# Patient Record
Sex: FEMALE | Race: WHITE | NOT HISPANIC OR LATINO | Employment: UNEMPLOYED | URBAN - METROPOLITAN AREA
[De-identification: names, ages, dates, MRNs, and addresses within clinical notes are randomized per-mention and may not be internally consistent; named-entity substitution may affect disease eponyms.]

---

## 2017-04-18 ENCOUNTER — HOSPITAL ENCOUNTER (EMERGENCY)
Facility: HOSPITAL | Age: 32
Discharge: HOME | End: 2017-04-18
Admitting: EMERGENCY MEDICINE
Payer: COMMERCIAL

## 2017-04-18 VITALS
WEIGHT: 175 LBS | SYSTOLIC BLOOD PRESSURE: 143 MMHG | HEIGHT: 66 IN | TEMPERATURE: 98.1 F | DIASTOLIC BLOOD PRESSURE: 64 MMHG | RESPIRATION RATE: 18 BRPM | BODY MASS INDEX: 28.12 KG/M2 | HEART RATE: 99 BPM | OXYGEN SATURATION: 99 %

## 2017-04-18 DIAGNOSIS — N89.8 VAGINAL DISCHARGE: Primary | ICD-10-CM

## 2017-04-18 PROCEDURE — 99283 EMERGENCY DEPT VISIT LOW MDM: CPT

## 2017-04-18 PROCEDURE — 87070 CULTURE OTHR SPECIMN AEROBIC: CPT | Performed by: PHYSICIAN ASSISTANT

## 2017-04-18 PROCEDURE — 87591 N.GONORRHOEAE DNA AMP PROB: CPT | Performed by: PHYSICIAN ASSISTANT

## 2017-04-18 PROCEDURE — 87491 CHLMYD TRACH DNA AMP PROBE: CPT | Performed by: PHYSICIAN ASSISTANT

## 2017-04-18 RX ORDER — CLINDAMYCIN HYDROCHLORIDE 300 MG/1
300 CAPSULE ORAL 2 TIMES DAILY
Qty: 14 CAPSULE | Refills: 0 | Status: SHIPPED | OUTPATIENT
Start: 2017-04-18 | End: 2017-04-25

## 2017-04-19 LAB
CHLAMYDIA DNA CVX QL NAA+PROBE: NORMAL
N GONORRHOEA DNA GENITAL QL NAA+PROBE: NORMAL

## 2017-04-21 LAB
BACTERIA GENITAL AEROBE CULT: NORMAL
BACTERIA GENITAL AEROBE CULT: NORMAL

## 2017-05-07 ENCOUNTER — APPOINTMENT (EMERGENCY)
Dept: RADIOLOGY | Facility: HOSPITAL | Age: 32
End: 2017-05-07
Payer: COMMERCIAL

## 2017-05-07 ENCOUNTER — HOSPITAL ENCOUNTER (EMERGENCY)
Facility: HOSPITAL | Age: 32
Discharge: HOME | End: 2017-05-07
Attending: EMERGENCY MEDICINE | Admitting: EMERGENCY MEDICINE
Payer: COMMERCIAL

## 2017-05-07 VITALS
WEIGHT: 165 LBS | DIASTOLIC BLOOD PRESSURE: 65 MMHG | SYSTOLIC BLOOD PRESSURE: 140 MMHG | HEART RATE: 100 BPM | OXYGEN SATURATION: 100 % | BODY MASS INDEX: 26.63 KG/M2 | RESPIRATION RATE: 18 BRPM

## 2017-05-07 DIAGNOSIS — S83.91XA RIGHT KNEE SPRAIN: Primary | ICD-10-CM

## 2017-05-07 PROCEDURE — 73562 X-RAY EXAM OF KNEE 3: CPT

## 2017-05-07 PROCEDURE — 99283 EMERGENCY DEPT VISIT LOW MDM: CPT

## 2017-05-07 RX ORDER — TRAMADOL HYDROCHLORIDE 50 MG/1
50 TABLET ORAL ONCE
Status: DISCONTINUED | OUTPATIENT
Start: 2017-05-07 | End: 2017-05-07 | Stop reason: HOSPADM

## 2017-05-07 RX ORDER — IBUPROFEN 800 MG/1
800 TABLET ORAL EVERY 8 HOURS PRN
Qty: 15 TABLET | Refills: 0 | Status: SHIPPED | OUTPATIENT
Start: 2017-05-07 | End: 2017-08-25

## 2017-05-07 RX ORDER — TRAMADOL HYDROCHLORIDE 50 MG/1
50 TABLET ORAL EVERY 8 HOURS PRN
Qty: 15 TABLET | Refills: 0 | Status: SHIPPED | OUTPATIENT
Start: 2017-05-07 | End: 2017-08-25

## 2017-05-07 RX ORDER — IBUPROFEN 400 MG/1
800 TABLET ORAL ONCE
Status: DISCONTINUED | OUTPATIENT
Start: 2017-05-07 | End: 2017-05-07 | Stop reason: HOSPADM

## 2017-05-09 ENCOUNTER — GENERIC CONVERSION - ENCOUNTER (OUTPATIENT)
Dept: OTHER | Facility: OTHER | Age: 32
End: 2017-05-09

## 2017-05-09 ENCOUNTER — ALLSCRIPTS OFFICE VISIT (OUTPATIENT)
Dept: OTHER | Facility: OTHER | Age: 32
End: 2017-05-09

## 2017-05-17 ENCOUNTER — GENERIC CONVERSION - ENCOUNTER (OUTPATIENT)
Dept: OTHER | Facility: OTHER | Age: 32
End: 2017-05-17

## 2017-07-05 ENCOUNTER — ALLSCRIPTS OFFICE VISIT (OUTPATIENT)
Dept: OTHER | Facility: OTHER | Age: 32
End: 2017-07-05

## 2017-07-05 DIAGNOSIS — M25.561 PAIN IN RIGHT KNEE: ICD-10-CM

## 2017-07-15 ENCOUNTER — HOSPITAL ENCOUNTER (OUTPATIENT)
Dept: RADIOLOGY | Facility: HOSPITAL | Age: 32
Discharge: HOME | End: 2017-07-15
Attending: ORTHOPAEDIC SURGERY
Payer: COMMERCIAL

## 2017-07-15 DIAGNOSIS — M25.561 PAIN IN RIGHT KNEE: ICD-10-CM

## 2017-07-15 PROCEDURE — 73721 MRI JNT OF LWR EXTRE W/O DYE: CPT

## 2017-07-19 ENCOUNTER — ALLSCRIPTS OFFICE VISIT (OUTPATIENT)
Dept: OTHER | Facility: OTHER | Age: 32
End: 2017-07-19

## 2017-07-28 ENCOUNTER — ALLSCRIPTS OFFICE VISIT (OUTPATIENT)
Dept: OTHER | Facility: OTHER | Age: 32
End: 2017-07-28

## 2017-08-25 ENCOUNTER — APPOINTMENT (EMERGENCY)
Dept: RADIOLOGY | Facility: HOSPITAL | Age: 32
End: 2017-08-25
Payer: COMMERCIAL

## 2017-08-25 ENCOUNTER — HOSPITAL ENCOUNTER (EMERGENCY)
Facility: HOSPITAL | Age: 32
Discharge: HOME | End: 2017-08-25
Admitting: EMERGENCY MEDICINE
Payer: COMMERCIAL

## 2017-08-25 VITALS
HEART RATE: 108 BPM | SYSTOLIC BLOOD PRESSURE: 122 MMHG | HEIGHT: 66 IN | OXYGEN SATURATION: 97 % | WEIGHT: 171 LBS | RESPIRATION RATE: 20 BRPM | TEMPERATURE: 97.3 F | DIASTOLIC BLOOD PRESSURE: 71 MMHG | BODY MASS INDEX: 27.48 KG/M2

## 2017-08-25 DIAGNOSIS — O03.9 SPONTANEOUS ABORTION IN FIRST TRIMESTER: Primary | ICD-10-CM

## 2017-08-25 LAB
ABO GROUP BLD: NORMAL
ALBUMIN SERPL BCP-MCNC: 3.5 G/DL (ref 3.5–5)
ALP SERPL-CCNC: 60 U/L (ref 46–116)
ALT SERPL W P-5'-P-CCNC: 21 U/L (ref 12–78)
ANION GAP SERPL CALCULATED.3IONS-SCNC: 9 MMOL/L (ref 4–13)
APTT PPP: 26 SECONDS (ref 24–33)
AST SERPL W P-5'-P-CCNC: 14 U/L (ref 5–45)
B-HCG SERPL-ACNC: 55 MIU/ML
BASOPHILS # BLD AUTO: 0 THOUSANDS/ΜL (ref 0–0.1)
BASOPHILS NFR BLD AUTO: 0 % (ref 0–1)
BILIRUB SERPL-MCNC: 0.4 MG/DL (ref 0.2–1)
BLD GP AB SCN SERPL QL: NEGATIVE
BUN SERPL-MCNC: 9 MG/DL (ref 5–25)
CALCIUM SERPL-MCNC: 9.1 MG/DL (ref 8.3–10.1)
CHLORIDE SERPL-SCNC: 106 MMOL/L (ref 100–108)
CLARITY, POC: ABNORMAL
CO2 SERPL-SCNC: 25 MMOL/L (ref 21–32)
COLOR, POC: YELLOW
CREAT SERPL-MCNC: 0.82 MG/DL (ref 0.6–1.3)
EOSINOPHIL # BLD AUTO: 0.1 THOUSAND/ΜL (ref 0–0.61)
EOSINOPHIL NFR BLD AUTO: 1 % (ref 0–6)
ERYTHROCYTE [DISTWIDTH] IN BLOOD BY AUTOMATED COUNT: 13.8 % (ref 11.6–15.1)
EXT BLOOD URINE: ABNORMAL
EXT GLUCOSE, UA: NORMAL
EXT KETONES: ABNORMAL
EXT NITRITE, UA: ABNORMAL
EXT PH, UA: 7
EXT PROTEIN, UA: ABNORMAL
GFR SERPL CREATININE-BSD FRML MDRD: 94 ML/MIN/1.73SQ M
GLUCOSE SERPL-MCNC: 91 MG/DL (ref 65–140)
HCT VFR BLD AUTO: 41.5 % (ref 37–47)
HGB BLD-MCNC: 13.9 G/DL (ref 12–16)
INR PPP: 1.01 (ref 0.86–1.16)
LYMPHOCYTES # BLD AUTO: 1.5 THOUSANDS/ΜL (ref 0.6–4.47)
LYMPHOCYTES NFR BLD AUTO: 21 % (ref 14–44)
MCH RBC QN AUTO: 31 PG (ref 27–31)
MCHC RBC AUTO-ENTMCNC: 33.5 G/DL (ref 31.4–37.4)
MCV RBC AUTO: 92 FL (ref 82–98)
MONOCYTES # BLD AUTO: 0.5 THOUSAND/ΜL (ref 0.17–1.22)
MONOCYTES NFR BLD AUTO: 7 % (ref 4–12)
NEUTROPHILS # BLD AUTO: 5.1 THOUSANDS/ΜL (ref 1.85–7.62)
NEUTS SEG NFR BLD AUTO: 70 % (ref 43–75)
NRBC BLD AUTO-RTO: 0 /100 WBCS
PLATELET # BLD AUTO: 279 THOUSANDS/UL (ref 130–400)
PMV BLD AUTO: 7.9 FL (ref 8.9–12.7)
POTASSIUM SERPL-SCNC: 4.1 MMOL/L (ref 3.5–5.3)
PROT SERPL-MCNC: 6.6 G/DL (ref 6.4–8.2)
PROTHROMBIN TIME: 10.6 SECONDS (ref 9.4–11.7)
RBC # BLD AUTO: 4.49 MILLION/UL (ref 4.2–5.4)
RH BLD: POSITIVE
SODIUM SERPL-SCNC: 140 MMOL/L (ref 136–145)
SPECIMEN EXPIRATION DATE: NORMAL
WBC # BLD AUTO: 7.3 THOUSAND/UL (ref 4.8–10.8)
WBC # BLD EST: ABNORMAL 10*3/UL

## 2017-08-25 PROCEDURE — 80053 COMPREHEN METABOLIC PANEL: CPT | Performed by: PHYSICIAN ASSISTANT

## 2017-08-25 PROCEDURE — 96361 HYDRATE IV INFUSION ADD-ON: CPT

## 2017-08-25 PROCEDURE — 86901 BLOOD TYPING SEROLOGIC RH(D): CPT | Performed by: PHYSICIAN ASSISTANT

## 2017-08-25 PROCEDURE — 84702 CHORIONIC GONADOTROPIN TEST: CPT | Performed by: PHYSICIAN ASSISTANT

## 2017-08-25 PROCEDURE — 81002 URINALYSIS NONAUTO W/O SCOPE: CPT | Performed by: PHYSICIAN ASSISTANT

## 2017-08-25 PROCEDURE — 99284 EMERGENCY DEPT VISIT MOD MDM: CPT

## 2017-08-25 PROCEDURE — 85025 COMPLETE CBC W/AUTO DIFF WBC: CPT | Performed by: PHYSICIAN ASSISTANT

## 2017-08-25 PROCEDURE — 96360 HYDRATION IV INFUSION INIT: CPT

## 2017-08-25 PROCEDURE — 86900 BLOOD TYPING SEROLOGIC ABO: CPT | Performed by: PHYSICIAN ASSISTANT

## 2017-08-25 PROCEDURE — 36415 COLL VENOUS BLD VENIPUNCTURE: CPT | Performed by: PHYSICIAN ASSISTANT

## 2017-08-25 PROCEDURE — 86850 RBC ANTIBODY SCREEN: CPT | Performed by: PHYSICIAN ASSISTANT

## 2017-08-25 PROCEDURE — 76801 OB US < 14 WKS SINGLE FETUS: CPT

## 2017-08-25 PROCEDURE — 85730 THROMBOPLASTIN TIME PARTIAL: CPT | Performed by: PHYSICIAN ASSISTANT

## 2017-08-25 PROCEDURE — 85610 PROTHROMBIN TIME: CPT | Performed by: PHYSICIAN ASSISTANT

## 2017-08-25 RX ADMIN — SODIUM CHLORIDE 1000 ML: 0.9 INJECTION, SOLUTION INTRAVENOUS at 14:39

## 2018-01-09 NOTE — MISCELLANEOUS
Message  L/M TO R/S MISSED APPT LAD      Signatures   Electronically signed by : MICHEL Flower; May  9 2017  2:30PM EST                       (Author)

## 2018-01-11 NOTE — MISCELLANEOUS
Provider Comments  Provider Comments:   CALLED PT REGARDING MISSED APPT, LM TO R/S /AT        Signatures   Electronically signed by : Steven Kawn MD; Dec 19 2016  8:44AM EST                       (Review)

## 2018-01-12 VITALS
HEART RATE: 118 BPM | SYSTOLIC BLOOD PRESSURE: 125 MMHG | BODY MASS INDEX: 30.53 KG/M2 | HEIGHT: 66 IN | DIASTOLIC BLOOD PRESSURE: 78 MMHG | WEIGHT: 190 LBS

## 2018-01-14 VITALS
SYSTOLIC BLOOD PRESSURE: 124 MMHG | HEART RATE: 94 BPM | DIASTOLIC BLOOD PRESSURE: 87 MMHG | HEIGHT: 66 IN | BODY MASS INDEX: 27.97 KG/M2 | WEIGHT: 174 LBS

## 2018-01-15 NOTE — MISCELLANEOUS
Provider Comments  Provider Comments:   L/M TO R/S MISSED APPT LAD      Signatures   Electronically signed by : MICHEL Carbajal; May  9 2017  2:31PM EST                       (Author)

## 2018-01-15 NOTE — MISCELLANEOUS
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

## 2018-01-23 ENCOUNTER — TRANSCRIBE ORDERS (OUTPATIENT)
Dept: ADMINISTRATIVE | Facility: HOSPITAL | Age: 33
End: 2018-01-23

## 2018-01-23 DIAGNOSIS — Z3A.10 10 WEEKS GESTATION OF PREGNANCY: Primary | ICD-10-CM

## 2018-01-25 ENCOUNTER — HOSPITAL ENCOUNTER (OUTPATIENT)
Dept: RADIOLOGY | Facility: HOSPITAL | Age: 33
Discharge: HOME | End: 2018-01-25
Attending: OBSTETRICS & GYNECOLOGY
Payer: COMMERCIAL

## 2018-01-25 DIAGNOSIS — Z3A.10 10 WEEKS GESTATION OF PREGNANCY: ICD-10-CM

## 2018-01-25 PROCEDURE — 76801 OB US < 14 WKS SINGLE FETUS: CPT

## 2018-03-22 ENCOUNTER — TRANSCRIBE ORDERS (OUTPATIENT)
Dept: ADMINISTRATIVE | Facility: HOSPITAL | Age: 33
End: 2018-03-22

## 2018-03-22 DIAGNOSIS — Z3A.20 20 WEEKS GESTATION OF PREGNANCY: Primary | ICD-10-CM

## 2018-04-06 ENCOUNTER — HOSPITAL ENCOUNTER (OUTPATIENT)
Dept: RADIOLOGY | Facility: HOSPITAL | Age: 33
Discharge: HOME | End: 2018-04-06
Attending: OBSTETRICS & GYNECOLOGY
Payer: COMMERCIAL

## 2018-04-06 DIAGNOSIS — Z3A.20 20 WEEKS GESTATION OF PREGNANCY: ICD-10-CM

## 2018-04-06 PROCEDURE — 76805 OB US >/= 14 WKS SNGL FETUS: CPT

## 2018-04-30 ENCOUNTER — TRANSCRIBE ORDERS (OUTPATIENT)
Dept: ADMINISTRATIVE | Facility: HOSPITAL | Age: 33
End: 2018-04-30

## 2018-04-30 DIAGNOSIS — K43.9 HERNIA OF ABDOMINAL WALL: Primary | ICD-10-CM

## 2018-05-17 ENCOUNTER — HOSPITAL ENCOUNTER (OUTPATIENT)
Dept: RADIOLOGY | Facility: HOSPITAL | Age: 33
Discharge: HOME | End: 2018-05-17
Attending: OBSTETRICS & GYNECOLOGY
Payer: COMMERCIAL

## 2018-05-17 DIAGNOSIS — K43.9 HERNIA OF ABDOMINAL WALL: ICD-10-CM

## 2018-05-17 PROCEDURE — 76705 ECHO EXAM OF ABDOMEN: CPT

## 2018-05-23 ENCOUNTER — TRANSCRIBE ORDERS (OUTPATIENT)
Dept: ADMINISTRATIVE | Facility: HOSPITAL | Age: 33
End: 2018-05-23

## 2018-05-23 DIAGNOSIS — R10.2 ADNEXAL TENDERNESS, RIGHT: Primary | ICD-10-CM

## 2018-05-25 ENCOUNTER — HOSPITAL ENCOUNTER (OUTPATIENT)
Dept: RADIOLOGY | Facility: HOSPITAL | Age: 33
Discharge: HOME | End: 2018-05-25
Attending: OBSTETRICS & GYNECOLOGY
Payer: COMMERCIAL

## 2018-05-25 DIAGNOSIS — R10.2 ADNEXAL TENDERNESS, RIGHT: ICD-10-CM

## 2018-05-25 PROCEDURE — 76816 OB US FOLLOW-UP PER FETUS: CPT

## 2018-07-12 ENCOUNTER — TRANSCRIBE ORDERS (OUTPATIENT)
Dept: ADMINISTRATIVE | Facility: HOSPITAL | Age: 33
End: 2018-07-12

## 2018-07-12 DIAGNOSIS — Z3A.34 34 WEEKS GESTATION OF PREGNANCY: Primary | ICD-10-CM

## 2018-07-16 ENCOUNTER — HOSPITAL ENCOUNTER (OUTPATIENT)
Dept: RADIOLOGY | Facility: HOSPITAL | Age: 33
Discharge: HOME | End: 2018-07-16
Attending: OBSTETRICS & GYNECOLOGY
Payer: COMMERCIAL

## 2018-07-16 DIAGNOSIS — Z3A.34 34 WEEKS GESTATION OF PREGNANCY: ICD-10-CM

## 2018-07-16 PROCEDURE — 76816 OB US FOLLOW-UP PER FETUS: CPT

## 2018-07-30 ENCOUNTER — HOSPITAL ENCOUNTER (EMERGENCY)
Facility: HOSPITAL | Age: 33
Discharge: OTHER INSTITUTION | End: 2018-07-30
Attending: EMERGENCY MEDICINE | Admitting: EMERGENCY MEDICINE
Payer: COMMERCIAL

## 2018-07-30 VITALS
DIASTOLIC BLOOD PRESSURE: 55 MMHG | SYSTOLIC BLOOD PRESSURE: 106 MMHG | OXYGEN SATURATION: 98 % | RESPIRATION RATE: 20 BRPM | TEMPERATURE: 98 F | HEART RATE: 100 BPM

## 2018-07-30 DIAGNOSIS — O60.00 PRETERM LABOR: Primary | ICD-10-CM

## 2018-07-30 LAB
ALBUMIN SERPL BCP-MCNC: 2.9 G/DL (ref 3.5–5)
ALP SERPL-CCNC: 87 U/L (ref 46–116)
ALT SERPL W P-5'-P-CCNC: 12 U/L (ref 12–78)
ANION GAP SERPL CALCULATED.3IONS-SCNC: 10 MMOL/L (ref 4–13)
AST SERPL W P-5'-P-CCNC: 8 U/L (ref 5–45)
BASOPHILS # BLD AUTO: 0.03 THOUSANDS/ΜL (ref 0–0.1)
BASOPHILS NFR BLD AUTO: 0 % (ref 0–1)
BILIRUB SERPL-MCNC: 0.2 MG/DL (ref 0.2–1)
BUN SERPL-MCNC: 10 MG/DL (ref 5–25)
CALCIUM SERPL-MCNC: 9.4 MG/DL (ref 8.3–10.1)
CHLORIDE SERPL-SCNC: 102 MMOL/L (ref 100–108)
CO2 SERPL-SCNC: 24 MMOL/L (ref 21–32)
CREAT SERPL-MCNC: 0.83 MG/DL (ref 0.6–1.3)
EOSINOPHIL # BLD AUTO: 0.17 THOUSAND/ΜL (ref 0–0.61)
EOSINOPHIL NFR BLD AUTO: 1 % (ref 0–6)
ERYTHROCYTE [DISTWIDTH] IN BLOOD BY AUTOMATED COUNT: 13.4 % (ref 11.6–15.1)
GFR SERPL CREATININE-BSD FRML MDRD: 92 ML/MIN/1.73SQ M
GLUCOSE SERPL-MCNC: 89 MG/DL (ref 65–140)
HCT VFR BLD AUTO: 35.7 % (ref 34.8–46.1)
HGB BLD-MCNC: 11.9 G/DL (ref 11.5–15.4)
IMM GRANULOCYTES # BLD AUTO: 0.04 THOUSAND/UL (ref 0–0.2)
IMM GRANULOCYTES NFR BLD AUTO: 0 % (ref 0–2)
LYMPHOCYTES # BLD AUTO: 2.38 THOUSANDS/ΜL (ref 0.6–4.47)
LYMPHOCYTES NFR BLD AUTO: 20 % (ref 14–44)
MCH RBC QN AUTO: 31.9 PG (ref 26.8–34.3)
MCHC RBC AUTO-ENTMCNC: 33.3 G/DL (ref 31.4–37.4)
MCV RBC AUTO: 96 FL (ref 82–98)
MONOCYTES # BLD AUTO: 0.93 THOUSAND/ΜL (ref 0.17–1.22)
MONOCYTES NFR BLD AUTO: 8 % (ref 4–12)
NEUTROPHILS # BLD AUTO: 8.28 THOUSANDS/ΜL (ref 1.85–7.62)
NEUTS SEG NFR BLD AUTO: 71 % (ref 43–75)
NRBC BLD AUTO-RTO: 0 /100 WBCS
PLATELET # BLD AUTO: 326 THOUSANDS/UL (ref 149–390)
PMV BLD AUTO: 9.7 FL (ref 8.9–12.7)
POTASSIUM SERPL-SCNC: 3.3 MMOL/L (ref 3.5–5.3)
PROT SERPL-MCNC: 6.9 G/DL (ref 6.4–8.2)
RBC # BLD AUTO: 3.73 MILLION/UL (ref 3.81–5.12)
SODIUM SERPL-SCNC: 136 MMOL/L (ref 136–145)
WBC # BLD AUTO: 11.83 THOUSAND/UL (ref 4.31–10.16)

## 2018-07-30 PROCEDURE — 99284 EMERGENCY DEPT VISIT MOD MDM: CPT

## 2018-07-30 PROCEDURE — 36415 COLL VENOUS BLD VENIPUNCTURE: CPT

## 2018-07-30 PROCEDURE — 85025 COMPLETE CBC W/AUTO DIFF WBC: CPT

## 2018-07-30 PROCEDURE — 80053 COMPREHEN METABOLIC PANEL: CPT

## 2018-07-30 NOTE — ED PROVIDER NOTES
History  Chief Complaint   Patient presents with    Laboring     Pt c/o possible contractions  Pt in ER with c/o intermittent back pain x 2days  Pain is sharp and radiates to her suprapubic area  She is A2 at [address] [facility]  She denies vaginal bleeding or other fluid d/c  Pt states that she called Dr Kerry Pelletier and was told to come here  None       Past Medical History:   Diagnosis Date    Bacterial vaginosis     Pilonidal abscess        Past Surgical History:   Procedure Laterality Date     SECTION      CYST REMOVAL         No family history on file  I have reviewed and agree with the history as documented  Social History   Substance Use Topics    Smoking status: Current Every Day Smoker     Packs/day: 1 00     Types: Cigarettes    Smokeless tobacco: Not on file    Alcohol use No        Review of Systems   Gastrointestinal: Positive for abdominal pain  Musculoskeletal: Positive for back pain  All other systems reviewed and are negative  Physical Exam  Physical Exam   Constitutional: She appears well-developed and well-nourished  HENT:   Head: Normocephalic and atraumatic  Eyes: EOM are normal  Pupils are equal, round, and reactive to light  Abdominal: There is no tenderness  There is no guarding  Gravid abdomen   Genitourinary: Pelvic exam was performed with patient supine  Cervix exhibits no discharge  Genitourinary Comments: Cervix dilated approx 1cm  No bloody discharge noted   Nursing note and vitals reviewed        Vital Signs  ED Triage Vitals [18 0149]   Temperature Pulse Respirations Blood Pressure SpO2   98 °F (36 7 °C) 100 20 150/83 98 %      Temp Source Heart Rate Source Patient Position - Orthostatic VS BP Location FiO2 (%)   Tympanic Monitor Lying Left arm --      Pain Score       8           Vitals:    18 0149 18 0315   BP: 150/83 106/55   Pulse: 100    Patient Position - Orthostatic VS: Lying        Visual Acuity      ED Medications  Medications - No data to display    Diagnostic Studies  Results Reviewed     Procedure Component Value Units Date/Time    Comprehensive metabolic panel [[id number]]  (Abnormal) Collected:  07/30/18 0157    Lab Status:  Final result Specimen:  Blood from Arm, Right Updated:  07/30/18 0230     Sodium 136 mmol/L      Potassium 3 3 (L) mmol/L      Chloride 102 mmol/L      CO2 24 mmol/L      Anion Gap 10 mmol/L      BUN 10 mg/dL      Creatinine 0 83 mg/dL      Glucose 89 mg/dL      Calcium 9 4 mg/dL      AST 8 U/L      ALT 12 U/L      Alkaline Phosphatase 87 U/L      Total Protein 6 9 g/dL      Albumin 2 9 (L) g/dL      Total Bilirubin 0 20 mg/dL      eGFR 92 ml/min/1 73sq m     Narrative:         National Kidney Disease Education Program recommendations are as follows:  GFR calculation is accurate only with a steady state creatinine  Chronic Kidney disease less than 60 ml/min/1 73 sq  meters  Kidney failure less than 15 ml/min/1 73 sq  meters      CBC and differential [[id number]]  (Abnormal) Collected:  07/30/18 0157    Lab Status:  Final result Specimen:  Blood from Arm, Right Updated:  07/30/18 0214     WBC 11 83 (H) Thousand/uL      RBC 3 73 (L) Million/uL      Hemoglobin 11 9 g/dL      Hematocrit 35 7 %      MCV 96 fL      MCH 31 9 pg      MCHC 33 3 g/dL      RDW 13 4 %      MPV 9 7 fL      Platelets 608 Thousands/uL      nRBC 0 /100 WBCs      Neutrophils Relative 71 %      Immat GRANS % 0 %      Lymphocytes Relative 20 %      Monocytes Relative 8 %      Eosinophils Relative 1 %      Basophils Relative 0 %      Neutrophils Absolute 8 28 (H) Thousands/µL      Immature Grans Absolute 0 04 Thousand/uL      Lymphocytes Absolute 2 38 Thousands/µL      Monocytes Absolute 0 93 Thousand/µL      Eosinophils Absolute 0 17 Thousand/µL      Basophils Absolute 0 03 Thousands/µL                  No orders to display              Procedures  Procedures       Phone Contacts  ED Phone Contact    ED Course MDM  Number of Diagnoses or Management Options   labor:   Diagnosis management comments: D/w Dr Terri rGeen  [facility] fetal monitoring          Amount and/or Complexity of Data Reviewed  Clinical lab tests: ordered and reviewed    Risk of Complications, Morbidity, and/or Mortality  Presenting problems: high  Diagnostic procedures: high  Management options: high    Patient Progress  Patient progress: stable    CritCare Time    Disposition  Final diagnoses:    labor     Time reflects when diagnosis was documented in both MDM as applicable and the Disposition within this note     Time User Action Codes Description Comment    2018  2:56 AM Saravanan Manzanares [O60 00]  labor       ED Disposition     ED Disposition Condition Comment    Transfer to Another [facility] Silviano should be transferred out to [facility]       MD Documentation      Most Recent Value   Patient Condition  An emergency transfer is being made prior to stabilization due to the need for definitive care and the benefit of transfer outweighs the risk, Pregnant woman having contractions   Reason for Transfer  Level of Care needed not available at this facility   Benefits of Transfer  Specialized equipment and/or services available at the receiving facility (Include comment)________________________, Continuity of care   Risks of Transfer  Potential for delay in receiving treatment, Potential deterioration of medical condition, Increased discomfort during transfer, Possible worsening of condition or death during transfer   Accepting Physician  Boyd Correa [place] Sending MD Dr Dionisio Paradise   Provider Certification  General risk, such as traffic hazards, adverse weather conditions, rough terrain or turbulence, possible failure of equipment (including vehicle or aircraft), or consequences of actions of persons outside the control of the transport personnel, Risk of worsening condition, Unanticipated needs of medical equipment and personnel during transport      RN Documentation      [facility] [address], [address] [place] [address]   Bed Assignment  OB labor, triage   Report Given to  Amrit Kent   Medications Reviewed with Next Provider of Service  Yes   Transport Mode  Ambulance   Level of Care  Advanced life support   Copies of Medical Records Sent  History and Physical   Patient Belongings Disposition  Sent with patient      Follow-up Information    None         There are no discharge medications for this patient  No discharge procedures on file      ED Provider  Electronically Signed by           Abebe Grider DO  08/01/18 8364

## 2018-07-30 NOTE — EMTALA/ACUTE CARE TRANSFER
[facility] EMERGENCY DEPARTMENT  [address] [facility] [address]  Dept: [phone]      EMTALA TRANSFER CONSENT    NAME Catalina Hubbard                                         1985                              MRN [id number]    I have been informed of my rights regarding examination, treatment, and transfer   by Dr Jennifer Arcos DO    Benefits: Specialized equipment and/or services available at the receiving facility (Include comment)________________________, Continuity of care    Risks: Potential for delay in receiving treatment, Potential deterioration of medical condition, Increased discomfort during transfer, Possible worsening of condition or death during transfer      Consent for Transfer:  I acknowledge that my medical condition has been evaluated and explained to me by the emergency department physician or other qualified medical person and/or my attending physician, who has recommended that I be transferred to the service of  Accepting Physician: Dr Cleveland Jung at [address], [address] : [place], [place]  The above potential benefits of such transfer, the potential risks associated with such transfer, and the probable risks of not being transferred have been explained to me, and I fully understand them  The doctor has explained that, in my case, the benefits of transfer outweigh the risks  I agree to be transferred  I authorize the performance of emergency medical procedures and treatments upon me in both transit and upon arrival at the receiving facility  Additionally, I authorize the release of any and all medical records to the receiving facility and request they be transported with me, if possible  I understand that the safest mode of transportation during a medical emergency is an ambulance and that the Hospital advocates the use of this mode of transport   Risks of traveling to the receiving facility by car, including absence of medical control, life sustaining equipment, such as oxygen, and medical personnel has been explained to me and I fully understand them  (TIFFANIE CORRECT BOX BELOW)  [  ]  I consent to the stated transfer and to be transported by ambulance/helicopter  [  ]  I consent to the stated transfer, but refuse transportation by ambulance and accept full responsibility for my transportation by car  I understand the risks of non-ambulance transfers and I exonerate the Hospital and its staff from any deterioration in my condition that results from this refusal     X___________________________________________    DATE  18  TIME________  Signature of patient or legally responsible individual signing on patient behalf           RELATIONSHIP TO PATIENT_________________________          Provider Certification    NAME Yifan Otoole                                        [facility] 1985                              MRN [id number]    A medical screening exam was performed on the above named patient  Based on the examination:    Condition Necessitating Transfer The encounter diagnosis was  labor  Patient Condition:  An emergency transfer is being made prior to stabilization due to the need for definitive care and the benefit of transfer outweighs the risk, Pregnant woman having contractions    Reason for Transfer: Level of Care needed not available at this facility    Transfer Requirements: [address], [place]   · Space available and qualified personnel available for treatment as acknowledged by    · Agreed to accept transfer and to provide appropriate medical treatment as acknowledged by       Dr Mae Luu  · Appropriate medical records of the examination and treatment of the patient are provided at the time of transfer   [address] _______  · Transfer will be performed by qualified personnel from    and appropriate transfer equipment as required, including the use of necessary and appropriate life support measures  Provider Certification: I have examined the patient and explained the following risks and benefits of being transferred/refusing transfer to the patient/family:  General risk, such as traffic hazards, adverse weather conditions, rough terrain or turbulence, possible failure of equipment (including vehicle or aircraft), or consequences of actions of persons outside the control of the transport personnel, Risk of worsening condition, Unanticipated needs of medical equipment and personnel during transport      Based on these reasonable risks and benefits to the patient and/or the unborn child(steven), and based upon the information available at the time of the patients examination, I certify that the medical benefits reasonably to be expected from the provision of appropriate medical treatments at another medical facility outweigh the increasing risks, if any, to the individuals medical condition, and in the case of labor to the unborn child, from effecting the transfer      X____________________________________________ DATE 07/30/18        TIME_______      ORIGINAL - SEND TO MEDICAL RECORDS   COPY - SEND WITH PATIENT DURING TRANSFER

## 2018-07-30 NOTE — EMTALA/ACUTE CARE TRANSFER
[facility] EMERGENCY DEPARTMENT  [address] [address]  Dept: [phone]      EMTALA TRANSFER CONSENT    NAME Marce Denis                                         1985                              MRN [id number]    I have been informed of my rights regarding examination, treatment, and transfer   by Dr Darrell Abdullahi DO    Benefits: Specialized equipment and/or services available at the receiving facility (Include comment)________________________, Continuity of care    Risks: Potential for delay in receiving treatment, Potential deterioration of medical condition, Increased discomfort during transfer, Possible worsening of condition or death during transfer      { ED EMTALA TRANSFER CHOICES:[id number]}    I authorize the performance of emergency medical procedures and treatments upon me in both transit and upon arrival at the receiving facility  Additionally, I authorize the release of any and all medical records to the receiving facility and request they be transported with me, if possible  I understand that the safest mode of transportation during a medical emergency is an ambulance and that the Hospital advocates the use of this mode of transport  Risks of traveling to the receiving facility by car, including absence of medical control, life sustaining equipment, such as oxygen, and medical personnel has been explained to me and I fully understand them  (TIFFANIE CORRECT BOX BELOW)  [  ]  I consent to the stated transfer and to be transported by ambulance/helicopter  [  ]  I consent to the stated transfer, but refuse transportation by ambulance and accept full responsibility for my transportation by car    I understand the risks of non-ambulance transfers and I exonerate the Hospital and its staff from any deterioration in my condition that results from this refusal     X___________________________________________    DATE  18  TIME________  Signature of patient or legally responsible individual signing on patient behalf           RELATIONSHIP TO PATIENT_________________________          Provider Certification    NAME Chaparro Lawson                                         1985                              MRN [id number]    A medical screening exam was performed on the above named patient  Based on the examination:    Condition Necessitating Transfer The encounter diagnosis was  labor  Patient Condition: An emergency transfer is being made prior to stabilization due to the need for definitive care and the benefit of transfer outweighs the risk, Pregnant woman having contractions    Reason for Transfer: Level of Care needed not available at this facility    Transfer Requirements: [address], [place]   · Space available and qualified personnel available for treatment as acknowledged by    · Agreed to accept transfer and to provide appropriate medical treatment as acknowledged by       Dr Mallory Reed  · Appropriate medical records of the examination and treatment of the patient are provided at the time of transfer   [address] [facility] _______  · Transfer will be performed by qualified personnel from    and appropriate transfer equipment as required, including the use of necessary and appropriate life support measures      Provider Certification: I have examined the patient and explained the following risks and benefits of being transferred/refusing transfer to the patient/family:  General risk, such as traffic hazards, adverse weather conditions, rough terrain or turbulence, possible failure of equipment (including vehicle or aircraft), or consequences of actions of persons outside the control of the transport personnel, Risk of worsening condition, Unanticipated needs of medical equipment and personnel during transport      Based on these reasonable risks and benefits to the patient and/or the unborn child(steven), and based upon the information available at the time of the patients examination, I certify that the medical benefits reasonably to be expected from the provision of appropriate medical treatments at another medical facility outweigh the increasing risks, if any, to the individuals medical condition, and in the case of labor to the unborn child, from effecting the transfer      X____________________________________________ DATE 07/30/18        TIME_______      ORIGINAL - SEND TO MEDICAL RECORDS   COPY - SEND WITH PATIENT DURING TRANSFER

## 2018-09-04 ENCOUNTER — HOSPITAL ENCOUNTER (EMERGENCY)
Facility: HOSPITAL | Age: 33
Discharge: HOME | End: 2018-09-04
Attending: EMERGENCY MEDICINE | Admitting: EMERGENCY MEDICINE
Payer: COMMERCIAL

## 2018-09-04 VITALS
RESPIRATION RATE: 16 BRPM | SYSTOLIC BLOOD PRESSURE: 111 MMHG | OXYGEN SATURATION: 98 % | TEMPERATURE: 98.3 F | DIASTOLIC BLOOD PRESSURE: 72 MMHG | HEART RATE: 79 BPM

## 2018-09-04 DIAGNOSIS — L08.9 WOUND INFECTION: Primary | ICD-10-CM

## 2018-09-04 DIAGNOSIS — T14.8XXA WOUND INFECTION: Primary | ICD-10-CM

## 2018-09-04 LAB
BASOPHILS # BLD AUTO: 0.05 THOUSANDS/ΜL (ref 0–0.1)
BASOPHILS NFR BLD AUTO: 0 % (ref 0–1)
EOSINOPHIL # BLD AUTO: 0.28 THOUSAND/ΜL (ref 0–0.61)
EOSINOPHIL NFR BLD AUTO: 2 % (ref 0–6)
ERYTHROCYTE [DISTWIDTH] IN BLOOD BY AUTOMATED COUNT: 12.6 % (ref 11.6–15.1)
HCT VFR BLD AUTO: 39.3 % (ref 34.8–46.1)
HGB BLD-MCNC: 12.7 G/DL (ref 11.5–15.4)
IMM GRANULOCYTES # BLD AUTO: 0.03 THOUSAND/UL (ref 0–0.2)
IMM GRANULOCYTES NFR BLD AUTO: 0 % (ref 0–2)
LYMPHOCYTES # BLD AUTO: 2.12 THOUSANDS/ΜL (ref 0.6–4.47)
LYMPHOCYTES NFR BLD AUTO: 17 % (ref 14–44)
MCH RBC QN AUTO: 31.3 PG (ref 26.8–34.3)
MCHC RBC AUTO-ENTMCNC: 32.3 G/DL (ref 31.4–37.4)
MCV RBC AUTO: 97 FL (ref 82–98)
MONOCYTES # BLD AUTO: 0.7 THOUSAND/ΜL (ref 0.17–1.22)
MONOCYTES NFR BLD AUTO: 6 % (ref 4–12)
NEUTROPHILS # BLD AUTO: 9.59 THOUSANDS/ΜL (ref 1.85–7.62)
NEUTS SEG NFR BLD AUTO: 75 % (ref 43–75)
NRBC BLD AUTO-RTO: 0 /100 WBCS
PLATELET # BLD AUTO: 358 THOUSANDS/UL (ref 149–390)
PMV BLD AUTO: 9.8 FL (ref 8.9–12.7)
RBC # BLD AUTO: 4.06 MILLION/UL (ref 3.81–5.12)
WBC # BLD AUTO: 12.77 THOUSAND/UL (ref 4.31–10.16)

## 2018-09-04 PROCEDURE — 85025 COMPLETE CBC W/AUTO DIFF WBC: CPT | Performed by: EMERGENCY MEDICINE

## 2018-09-04 PROCEDURE — 36415 COLL VENOUS BLD VENIPUNCTURE: CPT | Performed by: EMERGENCY MEDICINE

## 2018-09-04 PROCEDURE — 87070 CULTURE OTHR SPECIMN AEROBIC: CPT | Performed by: EMERGENCY MEDICINE

## 2018-09-04 PROCEDURE — 87186 SC STD MICRODIL/AGAR DIL: CPT | Performed by: EMERGENCY MEDICINE

## 2018-09-04 PROCEDURE — 87205 SMEAR GRAM STAIN: CPT | Performed by: EMERGENCY MEDICINE

## 2018-09-04 PROCEDURE — 99283 EMERGENCY DEPT VISIT LOW MDM: CPT

## 2018-09-04 PROCEDURE — 87147 CULTURE TYPE IMMUNOLOGIC: CPT | Performed by: EMERGENCY MEDICINE

## 2018-09-04 RX ORDER — CLINDAMYCIN HYDROCHLORIDE 150 MG/1
300 CAPSULE ORAL ONCE
Status: COMPLETED | OUTPATIENT
Start: 2018-09-04 | End: 2018-09-04

## 2018-09-04 RX ORDER — CLINDAMYCIN HYDROCHLORIDE 300 MG/1
300 CAPSULE ORAL 3 TIMES DAILY
Qty: 21 CAPSULE | Refills: 0 | Status: SHIPPED | OUTPATIENT
Start: 2018-09-04 | End: 2018-09-11

## 2018-09-04 RX ADMIN — CLINDAMYCIN HYDROCHLORIDE 300 MG: 150 CAPSULE ORAL at 02:49

## 2018-09-04 NOTE — ED PROVIDER NOTES
History  Chief Complaint   Patient presents with    Wound Check     had csection on   Site looks clean and dry but there is pus coming from it when pressure applied     43-year-old female presents with drainage from her  scar  Patient delivered at 37 weeks 3 days 2 weeks ago  She had to have a  because of prior   No complications  No fevers chills nausea vomiting  No erythema or rash around the scar  No pain  History provided by:  Patient   used: No        None       Past Medical History:   Diagnosis Date    Bacterial vaginosis     Pilonidal abscess        Past Surgical History:   Procedure Laterality Date     SECTION      CYST REMOVAL         History reviewed  No pertinent family history  I have reviewed and agree with the history as documented  Social History   Substance Use Topics    Smoking status: Current Every Day Smoker     Packs/day: 1 00     Types: Cigarettes    Smokeless tobacco: Not on file    Alcohol use No        Review of Systems   All other systems reviewed and are negative  Physical Exam  Physical Exam   Constitutional: She is oriented to person, place, and time  She appears well-developed and well-nourished  HENT:   Head: Normocephalic and atraumatic  Eyes: EOM are normal  Pupils are equal, round, and reactive to light  Neck: Normal range of motion  Neck supple  Cardiovascular: Normal rate and regular rhythm  Pulmonary/Chest: Effort normal and breath sounds normal    Abdominal: Soft  Bowel sounds are normal  She exhibits no distension and no mass  There is no tenderness  There is no rebound and no guarding  No hernia   incision site in the lower abdomen transverse healing well with no surrounding erythema edema  There is 1 part of the incision site that is draining purulent drainage  No fluctuance or abscess noted  Musculoskeletal: Normal range of motion     Neurological: She is alert and oriented to person, place, and time  Skin: Skin is warm and dry  Psychiatric: She has a normal mood and affect  Nursing note and vitals reviewed  Vital Signs  ED Triage Vitals [09/04/18 0229]   Temperature Pulse Respirations Blood Pressure SpO2   98 3 °F (36 8 °C) 79 16 111/72 98 %      Temp Source Heart Rate Source Patient Position - Orthostatic VS BP Location FiO2 (%)   Oral Monitor Sitting Left arm --      Pain Score       5           Vitals:    09/04/18 0229   BP: 111/72   Pulse: 79   Patient Position - Orthostatic VS: Sitting       Visual Acuity      ED Medications  Medications   clindamycin (CLEOCIN) capsule 300 mg (300 mg Oral Given 9/4/18 0249)       Diagnostic Studies  Results Reviewed     Procedure Component Value Units Date/Time    CBC and differential [[id number]]  (Abnormal) Collected:  09/04/18 0246    Lab Status:  Final result Specimen:  Blood from Arm, Left Updated:  09/04/18 0251     WBC 12 77 (H) Thousand/uL      RBC 4 06 Million/uL      Hemoglobin 12 7 g/dL      Hematocrit 39 3 %      MCV 97 fL      MCH 31 3 pg      MCHC 32 3 g/dL      RDW 12 6 %      MPV 9 8 fL      Platelets 042 Thousands/uL      nRBC 0 /100 WBCs      Neutrophils Relative 75 %      Immat GRANS % 0 %      Lymphocytes Relative 17 %      Monocytes Relative 6 %      Eosinophils Relative 2 %      Basophils Relative 0 %      Neutrophils Absolute 9 59 (H) Thousands/µL      Immature Grans Absolute 0 03 Thousand/uL      Lymphocytes Absolute 2 12 Thousands/µL      Monocytes Absolute 0 70 Thousand/µL      Eosinophils Absolute 0 28 Thousand/µL      Basophils Absolute 0 05 Thousands/µL     Wound culture and Gram stain [[id number]] Collected:  09/04/18 0246    Lab Status:   In process Specimen:  Wound from Abdominal Updated:  09/04/18 0249                 No orders to display              Procedures  Procedures       Phone Contacts  ED Phone Contact    ED Course                               MDM  Number of Diagnoses or Management Options  Wound infection:   Diagnosis management comments: I obtained lab work  I discussed the results with the patient  Gave her a dose of clindamycin in the ED and wrote her a prescription for clindamycin  Counseled her on calling her OB tomorrow  Patient verbalized understanding of the instructions had no further questions at the time of discharge  Amount and/or Complexity of Data Reviewed  Clinical lab tests: ordered and reviewed  Tests in the medicine section of CPT®: ordered and reviewed    Patient Progress  Patient progress: stable    CritCare Time    Disposition  Final diagnoses:   Wound infection     Time reflects when diagnosis was documented in both MDM as applicable and the Disposition within this note     Time User Action Codes Description Comment    9/4/2018  2:58 AM ZoilaZbigniew Add [T14  8XXA,  L08 9] Wound infection       ED Disposition     ED Disposition Condition Comment    Discharge  [place] discharge to home/self care  Condition at discharge: Stable        Follow-up Information     Follow up With Specialties Details Why Contact Info Additional Information    Emile Amador MD Obstetrics and Gynecology, Obstetrics, Gynecology Schedule an appointment as soon as possible for a visit in 1 day  [address] [address] [phone]       Seth Guerra MD Family Medicine Schedule an appointment as soon as possible for a visit  [address] Emergency Department Emergency Medicine  If symptoms worsen [address] [facility] [address] [phone] [facility], [address]          Discharge Medication List as of 9/4/2018  2:59 AM      START taking these medications    Details   clindamycin (CLEOCIN) 300 MG capsule Take 1 capsule (300 mg total) by mouth 3 (three) times a day for 7 days, Starting Tue 9/4/2018, Until Tue 9/11/2018, Print           No discharge procedures on file      ED Provider  Electronically Signed by           Lisa Cerna DO  09/04/18 7975

## 2018-09-04 NOTE — DISCHARGE INSTRUCTIONS
Acute Wound Care   AMBULATORY CARE:   An acute wound  is an injury that causes a break in the skin  An acute wound can happen suddenly, last a short time, and may heal on its own  Common signs and symptoms of an acute wound:   · A cut, tear, or gash in your skin    · Bleeding, swelling, pain, or trouble moving the affected area    · Dirt or foreign objects inside the wound     · Milky, yellow, green, or brown pus in the wound     · Red, tender, or warm area around the pus    · Fever  Seek care immediately if:   · You have pus or a foul odor coming from the wound  · You have sudden trouble breathing or chest pain  · Blood soaks through your bandage  Contact your healthcare provider if:   · You have muscle, joint, or body aches, sweating, or a fever  · You have more swelling, redness, or bleeding in your wound  · Your skin is itchy, swollen, or you have a rash  · You have questions or concerns about your condition or care  Treatment for an acute wound  may include any of the following:  · Cleansing  is done with soap and water to wash away germs and decrease the risk of infection  Sterile water further cleans the wound  The cleaning is done under high pressure with a catheter tip and large syringe  A solution that kills germs may also be used  · Debridement  is done to clean and remove objects, dirt, or dead tissues from the open wound  Healthcare providers may also drain the wound to clean out pus  · Closure of the wound  is done with stitches, staples, skin adhesive, or other treatments  This may be done if the wound is wide or deep  Stitches may be needed if the wound is in an area that moves a lot, such as the hands, feet, and joints  Stitches may help to keep the wound from getting infected  They may also decrease the amount of scarring you have  Some wounds may heal better without stitches    Wound care:   · If your wound was closed with thin strips of medical tape, keep them clean and dry  The strips of medical tape will fall off on their own  Do not pull them off  · Keep the bandage clean and dry  Do not remove the bandage over your wound unless your healthcare provider says it is okay  · Wash your hands before and after you take care of your wound to prevent infection  · Clean the wound as directed  If you cannot reach the wound, have someone help you  · If you have packing, make sure all the gauze used to pack the wound is taken out and replaced as directed  Keep track of how many gauze dressings are placed inside the wound  Follow up with your healthcare provider as directed:  Write down your questions so you remember to ask them during your visits  © 2016 [address] [employer] is for End User's use only and may not be sold, redistributed or otherwise used for commercial purposes  All illustrations and images included in CareNotes® are the copyrighted property of A D A M [employer]  or In[employer] Freeman  The above information is an  only  It is not intended as medical advice for individual conditions or treatments  Talk to your doctor, nurse or pharmacist before following any medical regimen to see if it is safe and effective for you

## 2018-09-06 LAB
BACTERIA WND AEROBE CULT: ABNORMAL
GRAM STN SPEC: ABNORMAL

## 2018-09-10 ENCOUNTER — VBI (OUTPATIENT)
Dept: FAMILY MEDICINE CLINIC | Facility: CLINIC | Age: 33
End: 2018-09-10

## 2018-09-10 NOTE — TELEPHONE ENCOUNTER
Pt was seen in [address] on 9/4/18  CC: Wound Check  DX: wound infection  Pt has appt f/u w OBGYN    Informed pt of  on call, office hours and phone number

## 2019-06-23 ENCOUNTER — HOSPITAL ENCOUNTER (EMERGENCY)
Facility: HOSPITAL | Age: 34
Discharge: HOME | End: 2019-06-23
Attending: EMERGENCY MEDICINE | Admitting: EMERGENCY MEDICINE
Payer: COMMERCIAL

## 2019-06-23 VITALS
TEMPERATURE: 96.1 F | RESPIRATION RATE: 18 BRPM | DIASTOLIC BLOOD PRESSURE: 72 MMHG | SYSTOLIC BLOOD PRESSURE: 120 MMHG | OXYGEN SATURATION: 98 % | BODY MASS INDEX: 23.4 KG/M2 | HEART RATE: 96 BPM | WEIGHT: 145 LBS

## 2019-06-23 DIAGNOSIS — M54.9 MUSCULOSKELETAL BACK PAIN: ICD-10-CM

## 2019-06-23 DIAGNOSIS — M54.9 BACK PAIN: Primary | ICD-10-CM

## 2019-06-23 PROCEDURE — 99283 EMERGENCY DEPT VISIT LOW MDM: CPT

## 2019-06-23 RX ORDER — CYCLOBENZAPRINE HCL 10 MG
5 TABLET ORAL 3 TIMES DAILY PRN
Qty: 20 TABLET | Refills: 0 | Status: SHIPPED | OUTPATIENT
Start: 2019-06-23 | End: 2020-07-20

## 2019-06-23 RX ORDER — OXYCODONE HYDROCHLORIDE AND ACETAMINOPHEN 5; 325 MG/1; MG/1
1 TABLET ORAL EVERY 4 HOURS PRN
Qty: 15 TABLET | Refills: 0 | Status: SHIPPED | OUTPATIENT
Start: 2019-06-23 | End: 2020-07-20

## 2019-06-23 RX ORDER — METHYLPREDNISOLONE 4 MG/1
TABLET ORAL
Qty: 21 TABLET | Refills: 0 | Status: SHIPPED | OUTPATIENT
Start: 2019-06-23 | End: 2020-07-20

## 2019-06-23 RX ORDER — CYCLOBENZAPRINE HCL 10 MG
5 TABLET ORAL ONCE
Status: COMPLETED | OUTPATIENT
Start: 2019-06-23 | End: 2019-06-23

## 2019-06-23 RX ORDER — PREDNISONE 20 MG/1
60 TABLET ORAL ONCE
Status: COMPLETED | OUTPATIENT
Start: 2019-06-23 | End: 2019-06-23

## 2019-06-23 RX ORDER — OXYCODONE HYDROCHLORIDE AND ACETAMINOPHEN 5; 325 MG/1; MG/1
1 TABLET ORAL ONCE
Status: COMPLETED | OUTPATIENT
Start: 2019-06-23 | End: 2019-06-23

## 2019-06-23 RX ADMIN — CYCLOBENZAPRINE HYDROCHLORIDE 5 MG: 10 TABLET, FILM COATED ORAL at 23:37

## 2019-06-23 RX ADMIN — PREDNISONE 60 MG: 20 TABLET ORAL at 23:37

## 2019-06-23 RX ADMIN — OXYCODONE HYDROCHLORIDE AND ACETAMINOPHEN 1 TABLET: 5; 325 TABLET ORAL at 23:37

## 2019-07-02 ENCOUNTER — TRANSCRIBE ORDERS (OUTPATIENT)
Dept: ADMINISTRATIVE | Facility: HOSPITAL | Age: 34
End: 2019-07-02

## 2019-07-02 ENCOUNTER — HOSPITAL ENCOUNTER (OUTPATIENT)
Dept: RADIOLOGY | Facility: HOSPITAL | Age: 34
Discharge: HOME | End: 2019-07-02
Payer: COMMERCIAL

## 2019-07-02 DIAGNOSIS — M54.14 THORACIC RADICULOPATHY: Primary | ICD-10-CM

## 2019-07-02 PROCEDURE — 72070 X-RAY EXAM THORAC SPINE 2VWS: CPT

## 2020-05-28 ENCOUNTER — TELEPHONE (OUTPATIENT)
Dept: NEUROLOGY | Facility: CLINIC | Age: 35
End: 2020-05-28

## 2020-05-29 ENCOUNTER — TELEPHONE (OUTPATIENT)
Dept: NEUROLOGY | Facility: CLINIC | Age: 35
End: 2020-05-29

## 2020-07-20 ENCOUNTER — CONSULT (OUTPATIENT)
Dept: NEUROLOGY | Facility: CLINIC | Age: 35
End: 2020-07-20
Payer: COMMERCIAL

## 2020-07-20 VITALS
TEMPERATURE: 98.4 F | DIASTOLIC BLOOD PRESSURE: 64 MMHG | BODY MASS INDEX: 23.54 KG/M2 | HEART RATE: 88 BPM | SYSTOLIC BLOOD PRESSURE: 94 MMHG | HEIGHT: 67 IN | WEIGHT: 150 LBS

## 2020-07-20 DIAGNOSIS — R20.2 NUMBNESS AND TINGLING: ICD-10-CM

## 2020-07-20 DIAGNOSIS — R20.0 NUMBNESS AND TINGLING: ICD-10-CM

## 2020-07-20 DIAGNOSIS — R20.2 PARESTHESIAS: Primary | ICD-10-CM

## 2020-07-20 DIAGNOSIS — N39.41 URGENCY INCONTINENCE: ICD-10-CM

## 2020-07-20 PROCEDURE — 99205 OFFICE O/P NEW HI 60 MIN: CPT | Performed by: PSYCHIATRY & NEUROLOGY

## 2020-07-20 NOTE — LETTER
July 20, 2020     Michael Rodriges MD  [facility] [address]  [address] [address] [facility] [address]    Patient: Aida Pitts   YOB: 1985   Date of Visit: 7/20/2020       Dear Dr Prieto Neighbours:    Thank you for referring Juan Aaron to me for evaluation  Below are my notes for this consultation  If you have questions, please do not hesitate to call me  I look forward to following your patient along with you  Sincerely,        Yosef Easley MD        CC: No Recipients  Yosef Easley MD  7/20/2020  8:54 AM  Sign at close encounter  Outpatient Neurology History and Physical  Aida Pitts  [id number]  38 y o   1985          Consult: Yes    Michael Rodriges MD      Chief Complaint   Patient presents with    Paresthesias     NP-Dr Tyra Miranda           History Obtained from: Patient    HPI:     Aida Pitts is a 37 yo F with PMH of scoliosis presents with cc of paresthesia  She reports intermittently paresthesia in lower and upper extremity for past year  Recently it's been getting worse  She reports left first 3 digit numbness and tingling radiating to forearm, arm and left shoulder  Few minutes later, she had b/l hip numbness that radiated down into legs and feet  At times her right hand can go numb  At times, when she's sitting on toilette, her legs can go numb  At times while having sex if in wrong position, her legs can go numb as well  She has problem holding her urine  She has had bowel urgency 2x in past  Denies saddle anesthesia  Denies blurred, loss of vision or eye pain associated  She does report mid to lower back pain that is usually constant  Thus far, she's had xray of thoracic spine and it showed scoliosis with convexity to right side  Denies associated headaches  Her mother has neuropathy and colon cancer  Can't recall of anyone with MS         Past Medical History:   Diagnosis Date    Bacterial vaginosis     Left arm numbness     7/2/2020- Left arm numb and radiated up left arm into shoulder-    Paresthesias     Hips down both legs    Pilonidal abscess     Scoliosis                Current Outpatient Medications on File Prior to Visit   Medication Sig Dispense Refill    [DISCONTINUED] cyclobenzaprine (FLEXERIL) 10 mg tablet Take 0 5 tablets (5 mg total) by mouth 3 (three) times a day as needed for muscle spasms for up to 5 days 20 tablet 0    [DISCONTINUED] methylPREDNISolone 4 MG tablet therapy pack Use as directed on package 21 tablet 0    [DISCONTINUED] oxyCODONE-acetaminophen (PERCOCET) 5-325 mg per tablet Take 1 tablet by mouth every 4 (four) hours as needed for moderate pain for up to 15 dosesMax Daily Amount: 6 tablets 15 tablet 0     No current facility-administered medications on file prior to visit  No Known Allergies      Family History   Problem Relation Age of Onset   Eva Augustin Colon cancer Mother    Eva Augustin Lung cancer Mother     Autism Son     No Known Problems Daughter     No Known Problems Daughter     No Known Problems Daughter                 Past Surgical History:   Procedure Laterality Date     SECTION      CYST REMOVAL             Social History     Socioeconomic History    Marital status: Single     Spouse name: Not on file    Number of children: Not on file    Years of education: Not on file    Highest education level: Not on file   Occupational History    Not on file   Social Needs    Financial resource strain: Not on file    Food insecurity:     Worry: Not on file     Inability: Not on file    Transportation needs:     Medical: Not on file     Non-medical: Not on file   Tobacco Use    Smoking status: Current Every Day Smoker     Packs/day:  00     Types: Cigarettes    Smokeless tobacco: Never Used   Substance and Sexual Activity    Alcohol use: Yes     Comment:  Occ    Drug use: No    Sexual activity: Yes     Partners: Male   Lifestyle    Physical activity:     Days per week: Not on file     Minutes per session: Not on file    Stress: Not on file   Relationships    Social connections:     Talks on phone: Not on file     Gets together: Not on file     Attends Yarsanism service: Not on file     Active member of club or organization: Not on file     Attends meetings of clubs or organizations: Not on file     Relationship status: Not on file    Intimate partner violence:     Fear of current or ex partner: Not on file     Emotionally abused: Not on file     Physically abused: Not on file     Forced sexual activity: Not on file   Other Topics Concern    Not on file   Social History Narrative    Not on file       Review of Systems  Refer to positive review of systems in HPI  Constitutional- No fever  Eyes- No visual change  ENT- Hearing normal  CV- No chest pain  Resp- No Shortness of breath  GI- No diarrhea  - Bladder normal  MS- No Arthritis   Skin- No rash  Psych- No depression  Endo- No DM  Heme- No nodes    PHYSICAL EXAM:    Vitals:    07/20/20 0809   BP: 94/64   BP Location: Left arm   Patient Position: Sitting   Cuff Size: Adult   Pulse: 88   Temp: 98 4 °F (36 9 °C)   Weight: 68 kg (150 lb)   Height: 5' 6 75" (1 695 m)         Appearance: No Acute Distress  Ophthalmoscopic: Disc Flat, Normal fundus  Carotid/Heart/Peripheral Vascular: No Bruits, RRR  Orientation: Awake, Alert, and Oriented x 3  Mental status:  Memory: Registation 3/3 Recall 3/3  Attention: Normal  Knowledge: Appropriate  Language: No aphasia  Speech: No dysarthria  Cranial Nerves:  2 No Visual Defect on Confrontation; Pupils round, equal, reactive to light  3,4,6 Extraocular Movements Intact; no nystagmus  5 Facial Sensation Intact  7 No facial asymmetry  8 Intact hearing  9,10 Palate symmetric, normal gag  11 Good shoulder shrug  12 Tongue Midline  Gait: Stable, No ataxia, can perform tandem walking  Coordination: No ataxia with finger to nose testing and heel to shin testing  Sensory: Intact, Symmetric to Pinprick, Light Touch, Vibration, and Joint Position  Muscle Tone: Normal  Muscle exam  Arm Right Left Leg Right Left   Deltoid 5/5 5/5 Iliopsoas 5/5 5/5   Biceps 5/5 5/5 Quads 5/5 5/5   Triceps 5/5 5/5 Hamstrings 5/5 5/5   Wrist Extension 5/5 5/5 Ankle Dorsi Flexion 5/5 5/5   Wrist Flexion 5/5 5/5 Ankle Plantar Flexion 5/5 5/5   Interossei 5/5 5/5 Ankle Eversion 5/5 5/5   APB 5/5 5/5 Ankle Inversion 5/5 5/5     T6-8 paravertebral tenderness to palpation  Left sided phalen test positive     Reflexes   RJ BJ TJ KJ AJ Plantars Cervantes's   Right 2+ 2+ 2+ 2+ 2+ Downgoing Not present   Left 2+ 2+ 2+ 2+ 2+ Downgoing Not present         Personal review of        X ray of thoracic spine    Assessment/Plan:     1  Paresthesias  MRI brain MS wo and w contrast    MRI thoracic spine with and without contrast    Vitamin B12    Folate    Vitamin D 25 hydroxy    Protein electrophoresis, serum    Protein electrophoresis, urine    Copper Level    Zinc    Vitamin B12    Folate    Vitamin D 25 hydroxy    Protein electrophoresis, serum    Copper Level    Zinc    Lyme Antibody Profile with reflex to WB    HOSEA Screen w/ Reflex to Titer/Pattern    Lyme Antibody Profile with reflex to WB    EMG 2 Limb Upper Extremity   2  Urgency incontinence  MRI brain MS wo and w contrast    MRI thoracic spine with and without contrast   3  Numbness and tingling  MRI brain MS wo and w contrast    MRI thoracic spine with and without contrast    Vitamin B12    Folate    Vitamin D 25 hydroxy    Protein electrophoresis, serum    Protein electrophoresis, urine    Copper Level    Zinc    Vitamin B12    Folate    Vitamin D 25 hydroxy    Protein electrophoresis, serum    Copper Level    Zinc    Lyme Antibody Profile with reflex to WB    HOSEA Screen w/ Reflex to Titer/Pattern    Lyme Antibody Profile with reflex to WB    EMG 2 Limb Upper Extremity         Patient presents with alternative paresthesia to any 4 or all of extremities  Will need to first r/o MS considering her age and symptoms   Will get MRI brain MS protocol  Will get MRI thoracic spine to r/o OA, stenosis or demyelinating lesions  Will check for some reversible causes of paresthesia  Will get EMG of b/l UE  If above work up is negative, then will pursue for MRI cervical and LS spine  Counseling Documentation:  The patient and/or patient's family were  counseled regarding diagnostic results  Instructions for management,risk factor reductions,prognosis of disease were discussed  Patient and family were educated regarding impressions,risks and benefits of treatment options,importance of compliance with treatment  Total time of encounter: 60 min  More than 50% of time was spent in counseling and coordination of care of patient  VIDYA De León    [facility]  [address]  [address]

## 2020-07-20 NOTE — PROGRESS NOTES
Outpatient Neurology History and Physical  Lisa Ley  [id number]  38 y o   1985          Consult: Yes    Mare Jensen MD      Chief Complaint   Patient presents with    Paresthesias     NP-Dr Child Files           History Obtained from: Patient    HPI:     Lisa Ley is a 37 yo F with PMH of scoliosis presents with cc of paresthesia  She reports intermittently paresthesia in lower and upper extremity for past year  Recently it's been getting worse  She reports left first 3 digit numbness and tingling radiating to forearm, arm and left shoulder  Few minutes later, she had b/l hip numbness that radiated down into legs and feet  At times her right hand can go numb  At times, when she's sitting on toilette, her legs can go numb  At times while having sex if in wrong position, her legs can go numb as well  She has problem holding her urine  She has had bowel urgency 2x in past  Denies saddle anesthesia  Denies blurred, loss of vision or eye pain associated  She does report mid to lower back pain that is usually constant  Thus far, she's had xray of thoracic spine and it showed scoliosis with convexity to right side  Denies associated headaches  Her mother has neuropathy and colon cancer  Can't recall of anyone with MS         Past Medical History:   Diagnosis Date    Bacterial vaginosis     Left arm numbness     7/2/2020- Left arm numb and radiated up left arm into shoulder-    Paresthesias     Hips down both legs    Pilonidal abscess     Scoliosis                Current Outpatient Medications on File Prior to Visit   Medication Sig Dispense Refill    [DISCONTINUED] cyclobenzaprine (FLEXERIL) 10 mg tablet Take 0 5 tablets (5 mg total) by mouth 3 (three) times a day as needed for muscle spasms for up to 5 days 20 tablet 0    [DISCONTINUED] methylPREDNISolone 4 MG tablet therapy pack Use as directed on package 21 tablet 0    [DISCONTINUED] oxyCODONE-acetaminophen (PERCOCET) 5-325 mg per tablet Take 1 tablet by mouth every 4 (four) hours as needed for moderate pain for up to 15 dosesMax Daily Amount: 6 tablets 15 tablet 0     No current facility-administered medications on file prior to visit  No Known Allergies      Family History   Problem Relation Age of Onset   [facility] Colon cancer Mother    [facility] Lung cancer Mother     Autism Son     No Known Problems Daughter     No Known Problems Daughter     No Known Problems Daughter                 Past Surgical History:   Procedure Laterality Date     SECTION      CYST REMOVAL             Social History     Socioeconomic History    Marital status: Single     Spouse name: Not on file    Number of children: Not on file    Years of education: Not on file    Highest education level: Not on file   Occupational History    Not on file   Social Needs    Financial resource strain: Not on file    Food insecurity:     Worry: Not on file     Inability: Not on file    Transportation needs:     Medical: Not on file     Non-medical: Not on file   Tobacco Use    Smoking status: Current Every Day Smoker     Packs/day:  00     Types: Cigarettes    Smokeless tobacco: Never Used   Substance and Sexual Activity    Alcohol use: Yes     Comment:  Occ    Drug use: No    Sexual activity: Yes     Partners: Male   Lifestyle    Physical activity:     Days per week: Not on file     Minutes per session: Not on file    Stress: Not on file   Relationships    Social connections:     Talks on phone: Not on file     Gets together: Not on file     Attends Worship service: Not on file     Active member of club or organization: Not on file     Attends meetings of clubs or organizations: Not on file     Relationship status: Not on file    Intimate partner violence:     Fear of current or ex partner: Not on file     Emotionally abused: Not on file     Physically abused: Not on file     Forced sexual activity: Not on file   Other Topics Concern    Not on file Social History Narrative    Not on file       Review of Systems  Refer to positive review of systems in HPI  Constitutional- No fever  Eyes- No visual change  ENT- Hearing normal  CV- No chest pain  Resp- No Shortness of breath  GI- No diarrhea  - Bladder normal  MS- No Arthritis   Skin- No rash  Psych- No depression  Endo- No DM  Heme- No nodes    PHYSICAL EXAM:    Vitals:    07/20/20 0809   BP: 94/64   BP Location: Left arm   Patient Position: Sitting   Cuff Size: Adult   Pulse: 88   Temp: 98 4 °F (36 9 °C)   Weight: 68 kg (150 lb)   Height: 5' 6 75" (1 695 m)         Appearance: No Acute Distress  Ophthalmoscopic: Disc Flat, Normal fundus  Carotid/Heart/Peripheral Vascular: No Bruits, RRR  Orientation: Awake, Alert, and Oriented x 3  Mental status:  Memory: Registation 3/3 Recall 3/3  Attention: Normal  Knowledge: Appropriate  Language: No aphasia  Speech: No dysarthria  Cranial Nerves:  2 No Visual Defect on Confrontation; Pupils round, equal, reactive to light  3,4,6 Extraocular Movements Intact; no nystagmus  5 Facial Sensation Intact  7 No facial asymmetry  8 Intact hearing  9,10 Palate symmetric, normal gag  11 Good shoulder shrug  12 Tongue Midline  Gait: Stable, No ataxia, can perform tandem walking  Coordination: No ataxia with finger to nose testing and heel to shin testing  Sensory: Intact, Symmetric to Pinprick, Light Touch, Vibration, and Joint Position  Muscle Tone: Normal  Muscle exam  Arm Right Left Leg Right Left   Deltoid 5/5 5/5 Iliopsoas 5/5 5/5   Biceps 5/5 5/5 Quads 5/5 5/5   Triceps 5/5 5/5 Hamstrings 5/5 5/5   Wrist Extension 5/5 5/5 Ankle Dorsi Flexion 5/5 5/5   Wrist Flexion 5/5 5/5 Ankle Plantar Flexion 5/5 5/5   Interossei 5/5 5/5 Ankle Eversion 5/5 5/5   APB 5/5 5/5 Ankle Inversion 5/5 5/5     T6-8 paravertebral tenderness to palpation    Left sided phalen test positive     Reflexes   RJ BJ TJ KJ AJ Plantars Cervantes's   Right 2+ 2+ 2+ 2+ 2+ Downgoing Not present   Left 2+ 2+ 2+ 2+ 2+ Downgoing Not present         Personal review of        X ray of thoracic spine    Assessment/Plan:     1  Paresthesias  MRI brain MS wo and w contrast    MRI thoracic spine with and without contrast    Vitamin B12    Folate    Vitamin D 25 hydroxy    Protein electrophoresis, serum    Protein electrophoresis, urine    Copper Level    Zinc    Vitamin B12    Folate    Vitamin D 25 hydroxy    Protein electrophoresis, serum    Copper Level    Zinc    Lyme Antibody Profile with reflex to WB    HOSEA Screen w/ Reflex to Titer/Pattern    Lyme Antibody Profile with reflex to WB    EMG 2 Limb Upper Extremity   2  Urgency incontinence  MRI brain MS wo and w contrast    MRI thoracic spine with and without contrast   3  Numbness and tingling  MRI brain MS wo and w contrast    MRI thoracic spine with and without contrast    Vitamin B12    Folate    Vitamin D 25 hydroxy    Protein electrophoresis, serum    Protein electrophoresis, urine    Copper Level    Zinc    Vitamin B12    Folate    Vitamin D 25 hydroxy    Protein electrophoresis, serum    Copper Level    Zinc    Lyme Antibody Profile with reflex to WB    HOSEA Screen w/ Reflex to Titer/Pattern    Lyme Antibody Profile with reflex to WB    EMG 2 Limb Upper Extremity         Patient presents with alternative paresthesia to any 4 or all of extremities  Will need to first r/o MS considering her age and symptoms  Will get MRI brain MS protocol  Will get MRI thoracic spine to r/o OA, stenosis or demyelinating lesions  Will check for some reversible causes of paresthesia  Will get EMG of b/l UE  If above work up is negative, then will pursue for MRI cervical and LS spine  Counseling Documentation:  The patient and/or patient's family were  counseled regarding diagnostic results  Instructions for management,risk factor reductions,prognosis of disease were discussed   Patient and family were educated regarding impressions,risks and benefits of treatment options,importance of compliance with treatment  Total time of encounter: 60 min  More than 50% of time was spent in counseling and coordination of care of patient  VIDYA Nolan    [facility]  [address]  [address]

## 2020-08-06 ENCOUNTER — HOSPITAL ENCOUNTER (OUTPATIENT)
Dept: NEUROLOGY | Facility: HOSPITAL | Age: 35
Discharge: HOME | End: 2020-08-06
Attending: PSYCHIATRY & NEUROLOGY
Payer: COMMERCIAL

## 2020-08-06 DIAGNOSIS — R20.2 NUMBNESS AND TINGLING: ICD-10-CM

## 2020-08-06 DIAGNOSIS — R20.0 NUMBNESS AND TINGLING: ICD-10-CM

## 2020-08-06 DIAGNOSIS — R20.2 PARESTHESIAS: ICD-10-CM

## 2020-08-06 PROCEDURE — 95886 MUSC TEST DONE W/N TEST COMP: CPT | Performed by: PSYCHIATRY & NEUROLOGY

## 2020-08-06 PROCEDURE — 95911 NRV CNDJ TEST 9-10 STUDIES: CPT | Performed by: PSYCHIATRY & NEUROLOGY

## 2020-08-22 LAB
25(OH)D3+25(OH)D2 SERPL-MCNC: 24.4 NG/ML (ref 30–100)
ALBUMIN SERPL ELPH-MCNC: 3.7 G/DL (ref 2.9–4.4)
ALBUMIN/GLOB SERPL: 1.3 {RATIO} (ref 0.7–1.7)
ALPHA1 GLOB SERPL ELPH-MCNC: 0.2 G/DL (ref 0–0.4)
ALPHA2 GLOB SERPL ELPH-MCNC: 0.7 G/DL (ref 0.4–1)
B BURGDOR IGG+IGM SER-ACNC: <0.91 ISR (ref 0–0.9)
B-GLOBULIN SERPL ELPH-MCNC: 1 G/DL (ref 0.7–1.3)
COPPER SERPL-MCNC: 105 UG/DL (ref 72–166)
FOLATE SERPL-MCNC: 14.5 NG/ML
GAMMA GLOB SERPL ELPH-MCNC: 0.8 G/DL (ref 0.4–1.8)
GLOBULIN SER CALC-MCNC: 2.8 G/DL (ref 2.2–3.9)
LABORATORY COMMENT REPORT: NORMAL
M PROTEIN SERPL ELPH-MCNC: NORMAL G/DL
PROT SERPL-MCNC: 6.5 G/DL (ref 6–8.5)
VIT B12 SERPL-MCNC: 382 PG/ML (ref 232–1245)
ZINC SERPL-MCNC: 84 UG/DL (ref 56–134)

## 2020-08-24 ENCOUNTER — TELEPHONE (OUTPATIENT)
Dept: NEUROLOGY | Facility: CLINIC | Age: 35
End: 2020-08-24

## 2020-08-24 NOTE — TELEPHONE ENCOUNTER
Pt called for results of mri and labs  I do not see that mri was reviewed yet  Please review and advise  Mri thoracic is scheduled for 9/4    I did reviewed below results note  Lake Sethi MD   8/24/2020 10:12 AM       Patient's vit D level is mildly low  She can take otc 5000units D3 3x/week        Please advise   [phone] to leave a detailed message

## 2020-09-04 ENCOUNTER — HOSPITAL ENCOUNTER (OUTPATIENT)
Dept: RADIOLOGY | Facility: HOSPITAL | Age: 35
Discharge: HOME | End: 2020-09-04
Attending: PSYCHIATRY & NEUROLOGY
Payer: COMMERCIAL

## 2020-09-04 DIAGNOSIS — R20.2 NUMBNESS AND TINGLING: ICD-10-CM

## 2020-09-04 DIAGNOSIS — R20.2 PARESTHESIAS: ICD-10-CM

## 2020-09-04 DIAGNOSIS — R20.0 NUMBNESS AND TINGLING: ICD-10-CM

## 2020-09-04 DIAGNOSIS — N39.41 URGENCY INCONTINENCE: ICD-10-CM

## 2020-09-04 PROCEDURE — 72157 MRI CHEST SPINE W/O & W/DYE: CPT

## 2020-09-04 PROCEDURE — A9585 GADOBUTROL INJECTION: HCPCS | Performed by: PSYCHIATRY & NEUROLOGY

## 2020-09-04 RX ADMIN — GADOBUTROL 7 ML: 604.72 INJECTION INTRAVENOUS at 16:45

## 2020-09-08 ENCOUNTER — TELEPHONE (OUTPATIENT)
Dept: NEUROLOGY | Facility: CLINIC | Age: 35
End: 2020-09-08

## 2020-09-08 DIAGNOSIS — M51.24 THORACIC DISC HERNIATION: Primary | ICD-10-CM

## 2020-09-08 NOTE — TELEPHONE ENCOUNTER
----- Message from Yosef Easley MD sent at 9/8/2020 12:20 PM EDT -----  Patient has disc protrusion at T7-8  Would like to send her to Dr Della Philip for surgical opinion however most likely it will be conservatively managed

## 2020-09-08 NOTE — TELEPHONE ENCOUNTER
I called and spoke with the patient  I advised result, and I made her aware of the referral to the neurosurgeon who will be giving her a call to schedule an appt

## 2020-09-15 ENCOUNTER — TELEMEDICINE (OUTPATIENT)
Dept: NEUROSURGERY | Facility: CLINIC | Age: 35
End: 2020-09-15
Payer: COMMERCIAL

## 2020-09-15 VITALS — WEIGHT: 150 LBS | BODY MASS INDEX: 23.54 KG/M2 | HEIGHT: 67 IN

## 2020-09-15 DIAGNOSIS — M51.24 THORACIC DISC HERNIATION: ICD-10-CM

## 2020-09-15 PROCEDURE — 99213 OFFICE O/P EST LOW 20 MIN: CPT | Performed by: NEUROLOGICAL SURGERY

## 2020-09-15 RX ORDER — IBUPROFEN 200 MG
800 TABLET ORAL AS NEEDED
COMMUNITY

## 2020-09-15 NOTE — PROGRESS NOTES
Virtual Regular Visit      Assessment/Plan:    Problem List Items Addressed This Visit     None      Visit Diagnoses     Thoracic disc herniation                   Reason for visit is   Chief Complaint   Patient presents with    Virtual Regular Visit        Encounter provider Amparo Pinto MD    Provider located at [address] [place] [id number]      Recent Visits  No visits were found meeting these conditions  Showing recent visits within past 7 days and meeting all other requirements     Today's Visits  Date Type Provider Dept   09/15/20 Telemedicine Amparo Pinto MD [facility]   Showing today's visits and meeting all other requirements     Future Appointments  No visits were found meeting these conditions  Showing future appointments within next 150 days and meeting all other requirements        The patient was identified by name and date of birth  Jody Sotelo was informed that this is a telemedicine visit and that the visit is being conducted through [employer] and patient was informed that this is not a secure, HIPAA-complaint platform  She agrees to proceed     My office door was closed  No one else was in the room  She acknowledged consent and understanding of privacy and security of the video platform  The patient has agreed to participate and understands they can discontinue the visit at any time  Patient is aware this is a billable service  Subjective  Jody Sotelo is a 38 y o  female with a longstanding history of diffuse axial spine pain and numbness and tingling in her arms and legs  She also has urinary incontinence and intermittent fecal incontinence  She describes some difficulties with handwriting in fine motor tasks and occasional issues with balance  On occasion her legs will give out  Current pain medications are listed below    In the course of workup with Neurology she underwent an MRI of the thoracic spine and presents today to discuss the results  Assessment:    Patient is gradually worsening  14-year-old woman with diffuse axial spine pain and numbness and tingling in all 4 limbs  Based on exam over the video and review of Dr Megha Arceo notes, the patient does not appear to have any objective findings of radiculopathy or myelopathy  MRI imaging of the thoracic spine does demonstrated disc herniation which I think is incidental to the patient's overall presentation  Furthermore  imaging of the cervical spine is unremarkable  Would not recommend any surgical intervention  Encouraged the patient to continue to follow with Neurology for ongoing workup  She will follow up through this office on a p r n  Basis  History, physical examination and diagnostic tests were reviewed and questions answered  Diagnosis, care plan and treatment options were discussed  The patient understand instructions and will follow up as directed  Plan:    Follow-up: prn    Problem List Items Addressed This Visit     None      Visit Diagnoses     Thoracic disc herniation              Subjective/Objective     Chief Complaint    Back pain  The following portions of the patient's history were reviewed and updated as appropriate: allergies, current medications, past family history, past medical history, past social history, past surgical history and problem list     Investigations    I personally reviewed the MRI results with the patient:    MRI of the thoracic spine without contrast dated September 4th, 2020   imaging of the cervical spine is reviewed as well  No significant stenosis within the visualized cervical spine  At T7-T8 there is a small disc herniation which minimally contacts the ventral aspect of the spinal cord  No cord signal change  No other areas of significant neural compression  Mild degenerative changes throughout the thoracic spine          Past Medical History:   Diagnosis Date    Bacterial vaginosis     Left arm numbness     2020- Left arm numb and radiated up left arm into shoulder-    Paresthesias     Hips down both legs    Pilonidal abscess     Scoliosis        Past Surgical History:   Procedure Laterality Date     SECTION      x 2    CYST REMOVAL      OOPHORECTOMY         Current Outpatient Medications   Medication Sig Dispense Refill    Cholecalciferol (Vitamin D3) 125 MCG (5000 UT) TABS Take 5,000 Units by mouth daily      ibuprofen (MOTRIN) 200 mg tablet Take 800 mg by mouth as needed for severe pain       No current facility-administered medications for this visit  Allergies   Allergen Reactions    Anesthesia S-I-40 [Propofol] Nausea Only and Vomiting       Review of Systems   Constitutional: Positive for fatigue  HENT: Negative  Eyes: Negative  Respiratory: Negative  Cardiovascular: Negative  Gastrointestinal: Negative  Uncontrolled sometimes, has had accidents   Endocrine: Negative  Genitourinary: Positive for frequency and urgency  Leakage, recurrent accidents   Musculoskeletal: Positive for back pain (mid back and across lower back radiates into bilateral hips, bilateral legs, and bilateral feet), gait problem and neck pain (neck pain radiates into bilateral shoulders )  Skin: Negative  Allergic/Immunologic: Negative  Neurological: Positive for dizziness, weakness (bilateral hands and bilateral legs), numbness (face numbness, left arm, bilateral hands/fingers) and headaches  Negative for seizures and syncope  Hematological: Negative  Psychiatric/Behavioral: Positive for sleep disturbance (due to pain/numbness)  Negative for confusion  Video Exam    Vitals:    09/15/20 1451   Weight: 68 kg (150 lb)   Height: 5' 6 75" (1 695 m)       Physical Exam  Constitutional:       General: She is not in acute distress  Appearance: Normal appearance     Eyes:      Extraocular Movements: Extraocular movements intact  Pulmonary:      Effort: Pulmonary effort is normal  No respiratory distress  Musculoskeletal:         General: No deformity  Comments: Some restriction on flexion-extension of the thoracolumbar spine secondary to pain and stiffness  Skin:     General: Skin is dry  Neurological:      General: No focal deficit present  Mental Status: She is alert  Comments: No dysdiadochokinesia  Stands from a seated position without difficulty  Normal gait  Romberg negative  Psychiatric:         Mood and Affect: Mood normal          Behavior: Behavior normal           I spent 15 minutes directly with the patient during this visit      [address] acknowledges that she has consented to an online visit or consultation  She understands that the online visit is based solely on information provided by her, and that, in the absence of a face-to-face physical evaluation by the physician, the diagnosis she receives is both limited and provisional in terms of accuracy and completeness  This is not intended to replace a full medical face-to-face evaluation by the physician  Annabella Kay understands and accepts these terms

## 2020-09-30 ENCOUNTER — OFFICE VISIT (OUTPATIENT)
Dept: NEUROLOGY | Facility: CLINIC | Age: 35
End: 2020-09-30
Payer: COMMERCIAL

## 2020-09-30 VITALS
BODY MASS INDEX: 22.91 KG/M2 | SYSTOLIC BLOOD PRESSURE: 117 MMHG | HEIGHT: 67 IN | DIASTOLIC BLOOD PRESSURE: 75 MMHG | WEIGHT: 146 LBS | HEART RATE: 79 BPM | TEMPERATURE: 97 F

## 2020-09-30 DIAGNOSIS — M54.50 CHRONIC BILATERAL LOW BACK PAIN WITHOUT SCIATICA: ICD-10-CM

## 2020-09-30 DIAGNOSIS — M51.24 THORACIC DISC HERNIATION: Primary | ICD-10-CM

## 2020-09-30 DIAGNOSIS — R20.2 PARESTHESIA OF BOTH HANDS: ICD-10-CM

## 2020-09-30 DIAGNOSIS — G89.29 CHRONIC BILATERAL LOW BACK PAIN WITHOUT SCIATICA: ICD-10-CM

## 2020-09-30 PROCEDURE — 99215 OFFICE O/P EST HI 40 MIN: CPT | Performed by: PSYCHIATRY & NEUROLOGY

## 2020-09-30 RX ORDER — NORTRIPTYLINE HYDROCHLORIDE 25 MG/1
CAPSULE ORAL
Qty: 60 CAPSULE | Refills: 4 | Status: SHIPPED | OUTPATIENT
Start: 2020-09-30 | End: 2022-06-20 | Stop reason: ALTCHOICE

## 2020-09-30 NOTE — PROGRESS NOTES
Return NeuroOutpatient Note        Glennis Kehr  [id number]  38 y o   1985       CC: paresthesia       History obtained from:  Patient     HPI/Subjective:    Glennis Kehr is a 37 yo F with PMH of paresthesia presents as f/u  She was initially evaluated by us in July 2020 for intermittent paresthesia in lower and upper extremity for past year  She reports left first 3 digit numbness and tingling radiating to forearm, arm and left shoulder  Few minutes later, she had b/l hip numbness that radiated down into legs and feet  At times her right hand can go numb  At times, when she's sitting on toilette, her legs can go numb  At times while having sex if in wrong position, her legs can go numb as well  She has problem holding her urine  She has had bowel urgency 2x in past    Since initial visit, patient had MRI brain which was negative for demyelinating lesion and it was normal   Her thoracic spine imaging had revealed small focal T7-8 central disc herniation effacing ventral csf space and slight mass effect upon ventral aspect of cord  We had referred her to Dr Jarrell Felix  He doesn't think surgery is indicated       Thus far, she's had xray of thoracic spine and it showed scoliosis with convexity to right side     she also reports paresthesia in both hands and lower back     Patient has tried neurontin in past  She has been on dilaudid and percocet prior       She had EMG of b/l UE and it was completely normal     Past Medical History:   Diagnosis Date    Bacterial vaginosis     Left arm numbness     7/2/2020- Left arm numb and radiated up left arm into shoulder-    Paresthesias     Hips down both legs    Pilonidal abscess     Scoliosis      Social History     Socioeconomic History    Marital status: Single     Spouse name: Not on file    Number of children: Not on file    Years of education: Not on file    Highest education level: Not on file   Occupational History    Not on file   Social Needs    Financial resource strain: Not on file    Food insecurity     Worry: Not on file     Inability: Not on file   [employer] needs     Medical: Not on file     Non-medical: Not on file   Tobacco Use    Smoking status: Current Every Day Smoker     Packs/day: 1 00     Types: Cigarettes    Smokeless tobacco: Never Used   Substance and Sexual Activity    Alcohol use: Yes     Comment: Occ    Drug use: No    Sexual activity: Yes     Partners: Male   Lifestyle    Physical activity     Days per week: Not on file     Minutes per session: Not on file    Stress: Not on file   Relationships    Social connections     Talks on phone: Not on file     Gets together: Not on file     Attends Jewish service: Not on file     Active member of club or organization: Not on file     Attends meetings of clubs or organizations: Not on file     Relationship status: Not on file    Intimate partner violence     Fear of current or ex partner: Not on file     Emotionally abused: Not on file     Physically abused: Not on file     Forced sexual activity: Not on file   Other Topics Concern    Not on file   Social History Narrative    Not on file     Family History   Problem Relation Age of Onset    Colon cancer Mother     Lung cancer Mother     Autism Son     No Known Problems Daughter     No Known Problems Daughter     No Known Problems Daughter      Allergies   Allergen Reactions    Anesthesia S-I-40 [Propofol] Nausea Only and Vomiting     Current Outpatient Medications on File Prior to Visit   Medication Sig Dispense Refill    Cholecalciferol (Vitamin D3) 125 MCG (5000 UT) TABS Take 5,000 Units by mouth daily      ibuprofen (MOTRIN) 200 mg tablet Take 800 mg by mouth as needed for severe pain       No current facility-administered medications on file prior to visit  Review of Systems   Refer to positive review of systems in HPI     Review of Systems    Constitutional- No fever  Eyes- No visual change  ENT- Hearing normal  CV- No chest pain  Resp- No Shortness of breath  GI- No diarrhea  - Bladder normal  MS- No Arthritis   Skin- No rash  Psych- No depression  Endo- No DM  Heme- No nodes    Vitals:    09/30/20 1550   BP: 117/75   BP Location: Right arm   Patient Position: Sitting   Cuff Size: Adult   Pulse: 79   Temp: (!) 97 °F (36 1 °C)   Weight: 66 2 kg (146 lb)   Height: 5' 6 75" (1 695 m)       PHYSICAL EXAM:  Appearance: No Acute Distress  Ophthalmoscopic: Disc Flat, Normal fundus  Mental status:  Orientation: Awake, Alert, and Orientedx3  Memory: Registation 3/3 Recall 3/3  Attention: normal  Knowledge: good  Language: No aphasia  Speech: No dysarthria  Cranial Nerves:  2 No Visual Defect on Confrontation, Pupils round, equal, reactive to light  3,4,6 Extraocular Movements Intact, no nystagmus  5 Facial Sensation Intact  7 No facial asymmetry  8 Intact hearing  9,10 Palate symmetric, normal gag  11 Good shoulder shrug  12 Tongue Midline  Gait: Stable  Coordination: No ataxia with finger to nose testing, and heel to shin  Sensory: Intact, Symmetric to pinprick, light touch, vibration, and joint position  Muscle Tone: Normal              Muscle exam:  Arm Right Left Leg Right Left   Deltoid 5/5 5/5 Iliopsoas 5/5 5/5   Biceps 5/5 5/5 Quads 5/5 5/5   Triceps 5/5 5/5 Hamstrings 5/5 5/5   Wrist Extension 5/5 5/5 Ankle Dorsi Flexion 5/5 5/5   Wrist Flexion 5/5 5/5 Ankle Plantar Flexion 5/5 5/5   Interossei 5/5 5/5 Ankle Eversion 5/5 5/5   APB 5/5 5/5 Ankle Inversion 5/5 5/5       Reflexes   RJ BJ TJ KJ AJ Plantars Cervantes's   Right 2+ 2+ 2+ 2+ 2+ Downgoing Not present   Left 2+ 2+ 2+ 2+ 2+ Downgoing Not present     Personal review of  Labs:                  Diagnoses and all orders for this visit:      1  Thoracic disc herniation  nortriptyline (PAMELOR) 25 mg capsule    Ambulatory referral to Pain Management   2  Paresthesia of both hands  MRI cervical spine wo contrast    nortriptyline (PAMELOR) 25 mg capsule   3  Chronic bilateral low back pain without sciatica  MRI lumbar spine without contrast    nortriptyline (PAMELOR) 25 mg capsule    Ambulatory referral to Pain Management         Patient has been negative for MS  She does appear to suffer from a various level arthritis  Will get MRI cervical spine and LS spine to evaluate for OA  Will try pamelor for symptomatic relief  Asked her to look into a spine center that her insurance covers for her spine disc disease  Will refer her to pain management center               Total time of encounter:  45 min  More than 50% of the time was used in counseling and/or coordination of care  Extent of counseling and/or coordination of care        MD [facility]  [address]  [address]  [phone] no

## 2020-10-16 ENCOUNTER — HOSPITAL ENCOUNTER (OUTPATIENT)
Dept: RADIOLOGY | Facility: HOSPITAL | Age: 35
Discharge: HOME | End: 2020-10-16
Attending: PSYCHIATRY & NEUROLOGY
Payer: COMMERCIAL

## 2020-10-16 DIAGNOSIS — R20.2 PARESTHESIA OF BOTH HANDS: ICD-10-CM

## 2020-10-16 DIAGNOSIS — G89.29 CHRONIC BILATERAL LOW BACK PAIN WITHOUT SCIATICA: ICD-10-CM

## 2020-10-16 DIAGNOSIS — M54.50 CHRONIC BILATERAL LOW BACK PAIN WITHOUT SCIATICA: ICD-10-CM

## 2020-10-16 PROCEDURE — 72148 MRI LUMBAR SPINE W/O DYE: CPT

## 2020-10-16 PROCEDURE — G1004 CDSM NDSC: HCPCS

## 2020-10-16 PROCEDURE — 72141 MRI NECK SPINE W/O DYE: CPT

## 2020-10-19 ENCOUNTER — TELEPHONE (OUTPATIENT)
Dept: NEUROLOGY | Facility: CLINIC | Age: 35
End: 2020-10-19

## 2021-03-05 ENCOUNTER — TELEPHONE (OUTPATIENT)
Dept: NEUROLOGY | Facility: CLINIC | Age: 36
End: 2021-03-05

## 2021-03-05 NOTE — TELEPHONE ENCOUNTER
I called the patient and left a voicemail if she would like to reschedule her appt from today with [facility]  Awaiting call back

## 2022-03-14 ENCOUNTER — TELEPHONE (OUTPATIENT)
Dept: NEUROLOGY | Facility: CLINIC | Age: 37
End: 2022-03-14

## 2022-03-14 NOTE — TELEPHONE ENCOUNTER
Pt calling to report that she has not been able to work  Wondering if there is any way Dr Armando Malin can help  Chart reviewed  Pt has not been seen since 9/2020  Scheduled pt with Milton Hernandez for Wednesday 3/16  Pt to discuss with provider at that time

## 2022-03-16 ENCOUNTER — TELEMEDICINE (OUTPATIENT)
Dept: NEUROLOGY | Facility: CLINIC | Age: 37
End: 2022-03-16
Payer: COMMERCIAL

## 2022-03-16 DIAGNOSIS — R20.2 PARESTHESIAS: ICD-10-CM

## 2022-03-16 DIAGNOSIS — M25.551 HIP PAIN, RIGHT: ICD-10-CM

## 2022-03-16 DIAGNOSIS — M51.24 THORACIC DISC HERNIATION: Primary | ICD-10-CM

## 2022-03-16 PROCEDURE — 99214 OFFICE O/P EST MOD 30 MIN: CPT | Performed by: NURSE PRACTITIONER

## 2022-03-16 RX ORDER — GABAPENTIN 300 MG/1
300 CAPSULE ORAL 3 TIMES DAILY
Qty: 90 CAPSULE | Refills: 4 | Status: SHIPPED | OUTPATIENT
Start: 2022-03-16 | End: 2022-06-20 | Stop reason: DRUGHIGH

## 2022-03-16 NOTE — PATIENT INSTRUCTIONS
MRI of the Thoracic spine re: follow up on disc herniation  Referral to Pain Management for poss intervention of spine pain  Referral to Orthopedics re: Rt hip pain  Contact neurology office if unable to secure soon appointment with Ortho for simple xray of hip  Start on Gabapentin 300mg 3x a day  Follow up with Neurology office in 4 months or sooner if needed

## 2022-03-16 NOTE — PROGRESS NOTES
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 but denies any specific trauma or injury  Pt had Lumbar MRI in 10/2020 which was normal     Pt reports that she is not taking the Nortriptyline noting that it was ineffective  Last taken 9/2021  Pt reports that she did PT in the past and felt that it made her worse  Noted that her back was tense and stiff and she had difficulty with moving afterward  Pt was found to have scoliosis and smals T7-8 disc herniation  Noted that is she tries to cross her legs she will get pain down her middle back, lower back and then hip with radiation down into her leg  She feels that there is something wrong with her Rt hip as well  Stated that when her leg is raises her leg up she hears a snapping noise with severe pain  Noted pain after that for about 2 weeks  Will have reports MRI of the Thoracic spine  Pt did not feel that PT will improve her situation as it only made it worse  Will get Pain Management referral in place for evaluation of the Thoracic spine and the Rt hip referral to Orthopedics for poss derangement of the Rt hip  Will h old on hip XR as Orthopedics has XR in the office  Pt aware if delay in getting appointment to contact Neurology office and will get simple xr at that time  Pt has been on percocet, tramadol and nortriptyline for pain control, noting no effect with these medications  Has not been trialed on Gabapentin, will start at 300mg tid  Rt shoulder feels a numbness as well as b/l thighs  Noted that it is a numb feeling that is not a numb feeling        {[facility] HPI texts:31190}    {Common ambulatory SmartLinks:77354}         Objective:    unknown if currently breastfeeding  Physical Exam    Neurological Exam      ROS:    Review of Systems   Constitutional: Negative  Negative for appetite change and fever  HENT: Negative  Negative for hearing loss, tinnitus, trouble swallowing and voice change  Eyes: Negative  Negative for photophobia and pain  Respiratory: Negative  Negative for shortness of breath  Cardiovascular: Negative  Negative for palpitations  Gastrointestinal: Negative  Negative for nausea and vomiting  Endocrine: Negative  Negative for cold intolerance  Genitourinary: Negative  Negative for dysuria, frequency and urgency  Musculoskeletal: Positive for neck pain  Negative for myalgias  Skin: Negative  Negative for rash  Neurological: Positive for dizziness  Negative for tremors, seizures, syncope, facial asymmetry, speech difficulty, weakness, light-headedness, numbness and headaches  Hematological: Negative  Does not bruise/bleed easily  Psychiatric/Behavioral: Negative  Negative for confusion, hallucinations and sleep disturbance

## 2022-03-16 NOTE — PROGRESS NOTES
Virtual Regular Visit    Verification of patient location:    Patient is located in the following state in which I hold an active license [place]      Assessment/Plan:    Problem List Items Addressed This Visit        Musculoskeletal and Integument    Thoracic disc herniation - Primary    Relevant Medications    gabapentin (Neurontin) 300 mg capsule    Other Relevant Orders    Ambulatory Referral to Pain Management    MRI thoracic spine without contrast       Other    Paresthesias    Hip pain, right    Relevant Orders    Ambulatory Referral to Orthopedic Surgery        MRI of the Thoracic spine re: follow up on disc herniation  Referral to Pain Management for poss intervention of spine pain  Referral to Orthopedics re: Rt hip pain  Contact neurology office if unable to secure soon appointment with Ortho for simple xray of hip  Start on Gabapentin 300mg 3x a day  Follow up with Neurology office in 4 months or sooner if needed        Reason for visit is   Chief Complaint   Patient presents with    Virtual Regular Visit        Encounter provider CARMEN Lewis    Provider located at [address] [place], [place] [id number] [phone]      Recent Visits  No visits were found meeting these conditions  Showing recent visits within past 7 days and meeting all other requirements  Today's Visits  Date Type Provider Dept   03/16/22 Telemedicine CARMEN Lewis  [facility]   Showing today's visits and meeting all other requirements  Future Appointments  No visits were found meeting these conditions  Showing future appointments within next 150 days and meeting all other requirements       The patient was identified by name and date of birth   [place] was informed that this is a telemedicine visit and that the visit is being conducted through [employer] and patient was informed that this is not a secure, HIPAA-compliant platform  She agrees to proceed     My office door was closed  No one else was in the room  She acknowledged consent and understanding of privacy and security of the video platform  The patient has agreed to participate and understands they can discontinue the visit at any time  Patient is aware this is a billable service  Subjective/HPI:  Adriana Sharpe is a 25-year-old female with PMH of scoliosis who presented to the Neurology office for consultation 07/20/2020, she was seen by Dr Graham Felt  Patient reported intermittent paresthesia in lower and upper extremities for over year at that point in time  Noted it getting worse and her PCP referred her to Neurology  Patient reported 1st 3 digits in the left hand with numbness and tingling radiating to the forearm up her arm into the left shoulder  Few minutes later, patient would have bilateral hip numbness that radiated down into her legs and feet  Reported at times her right hand could go numb  She noted while seated or in a wrong sexual position her legs would go numb as well  Patient was having difficulty holding her urine and had bowel urgency a couple times in the past   Patient denies saddle anesthesia as well as any blurring or vision changes or headaches  Patient had reported mid to lower back pain on a constant basis  She did have x-rays of the thoracic spine showed scoliosis with convexity to the right side  The patient was sent for MRIs of the brain, cervical, thoracic and lumbar spine with without contrast MS protocol  She had multiple labs drawn which were normal   MRI of the brain was negative for demyelinating lesions and was normal   The thoracic spine MRI reviewed with small focal T7-T8 central disc herniation effacing ventral CSF space and slight mass effect upon the ventral aspect of the cord for which Neurosurgery was referred    Patient was deemed not surgical   Cervical MRI normal   Patient had EMG of the upper extremities done which was also normal   Patient had been on Dilaudid, Percocet the and Neurontin in the past, therefore she was started on nortriptyline 25 mg at bedtime  The patient had physical therapy and was recommend pain management follow-up  Patient reported that she felt physical therapy made her symptoms worse  Stated she was very stiff and sore, had little ability for movement after her therapies were completed  Patient reported she had been unable to follow-up with Neurology and Pain Management due to unreliable transportation issues  Patient returns the Neurology office today, reporting an increase in her activity in pain to her mid back and right hip  Patient continues to have reports of numbness to her shoulders hands, legs and feet  States this is more for numbness rather than pain, creates an odd sensation  Notes more numbness noted to the right shoulder compared to the left  Patient states bilateral lower extremities are equal in sensation  Patient notes having a difficulties moving her lower extremities, crossing her legs at which point she will have pain in her mid back that travels down into her low back then into her hips and down her legs  States most of the pain is to the right compared to the left  Patient also notes having significant right hip pain for which seems to be getting worse  Stated being in a precarious sexual position when with movement noted cracking and popping sensation in sounds out of her right hip being in a precarious sexual physician where she heard a popping and cracking in her hip associated with severe pain  Stated she remained in pain for approximately 2 weeks afterwards  Patient denies having any mechanism of injury, 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  Suspects her hip might be related to walking on cement floors for an extended period of time    Patient has not updated her PCP with regards to her hip pain, she does have an orthopedist to treated her knee however has not seen him in approximately 5 years  He orthopedics has x-ray available in their office, referral was provided for her to follow-up with them regarding her hip injury  Patient advised to contact Neurology office if she should have a delayed appointment at which point will get simple x-rays done of her right hip  With regards to her midback pain with movement and positioning, last MRI was in , repeat at this point time for any changes in her disc herniation  Referral provided for pain man she min follow-up regarding thoracic herniation  Will hold on PT at this point time pending pain management evaluation given her reports of exacerbation  Patient had reported being on Percocet, Dilaudid, Tylenol with codeine however could not remember being on gabapentin  Willing to try gabapentin 300 mg t i d  At this time  Can titrate up for effect over time  Patient will follow-up with Neurology office in 4 months to follow-up with progress and referrals      Past Medical History:   Diagnosis Date    Arthritis     right knee    Bacterial vaginosis     Baker cyst, right     knee    Left arm numbness     2020- Left arm numb and radiated up left arm into shoulder-    Paresthesias     Hips down both legs    Pilonidal abscess     Scoliosis     Tendinitis     right knee       Past Surgical History:   Procedure Laterality Date     SECTION      x 2    CYST REMOVAL      OOPHORECTOMY         Current Outpatient Medications   Medication Sig Dispense Refill    Cholecalciferol (Vitamin D3) 125 MCG (5000 UT) TABS Take 5,000 Units by mouth daily      ibuprofen (MOTRIN) 200 mg tablet Take 800 mg by mouth as needed for severe pain      gabapentin (Neurontin) 300 mg capsule Take 1 capsule (300 mg total) by mouth 3 (three) times a day 90 capsule 4    nortriptyline (PAMELOR) 25 mg capsule Start with 1 capsule at bedtime for 2 weeks and then take 2 capsules at bedtime  (Patient not taking: Reported on 3/16/2022 ) 60 capsule 4     No current facility-administered medications for this visit  Allergies   Allergen Reactions    Anesthesia S-I-40 [Propofol] Nausea Only and Vomiting       ROS:     Review of Systems   Constitutional: Negative  Negative for appetite change and fever  HENT: Negative  Negative for hearing loss, tinnitus, trouble swallowing and voice change  Eyes: Negative  Negative for photophobia and pain  Respiratory: Negative  Negative for shortness of breath  Cardiovascular: Negative  Negative for palpitations  Gastrointestinal: Negative  Negative for nausea and vomiting  Endocrine: Negative  Negative for cold intolerance  Genitourinary: Negative  Negative for dysuria, frequency and urgency  Musculoskeletal: Positive for neck pain  Negative for myalgias  Skin: Negative  Negative for rash  Neurological: Positive for dizziness  Negative for tremors, seizures, syncope, facial asymmetry, speech difficulty, weakness, light-headedness, numbness and headaches  Hematological: Negative  Does not bruise/bleed easily  Psychiatric/Behavioral: Negative  Negative for confusion, hallucinations and sleep disturbance  ROS reviewed and discussed with patient    Video Exam    There were no vitals filed for this visit  Physical Exam  Constitutional:       Appearance: Normal appearance  She is well-developed  HENT:      Head: Normocephalic  Nose: Nose normal    Eyes:      Pupils: Pupils are equal, round, and reactive to light  Cardiovascular:      Rate and Rhythm: Normal rate  Pulmonary:      Effort: Pulmonary effort is normal    Musculoskeletal:      Cervical back: Normal range of motion  Neurological:      General: No focal deficit present  Mental Status: She is alert and oriented to person, place, and time  Cranial Nerves: Cranial nerves are intact  Sensory: Sensory deficit present        Motor: Motor function is intact  Coordination: Coordination is intact  Gait: Gait is intact  Comments: Reports decreased sensation feeling in her right shoulder and bilateral upper extremities/thighs  Psychiatric:         Attention and Perception: Attention and perception normal          Mood and Affect: Mood and affect normal          Speech: Speech normal          Behavior: Behavior normal  Behavior is cooperative  Thought Content: Thought content normal          Cognition and Memory: Cognition and memory normal          Judgment: Judgment normal           I spent 25 minutes directly with the patient during this visit    VIRTUAL VISIT [address] verbally agrees to participate in [employer]  Pt is aware that [employer] could be limited without vital signs or the ability to perform a full hands-on physical exam  Lili Mcgarry understands she or the provider may request at any time to terminate the video visit and request the patient to seek care or treatment in person

## 2022-03-16 NOTE — PROGRESS NOTES
Patient ID: Sheridan Montesinos is a 39 y o  female  Assessment/Plan:    No problem-specific Assessment & Plan notes found for this encounter  {Assess/PlanSmartLinks:01611}       Subjective:    HPI    {[facility] HPI texts:28775}    {Common ambulatory SmartLinks:43472}         Objective:    unknown if currently breastfeeding  Physical Exam    Neurological Exam      ROS:    Review of Systems   Constitutional: Negative  Negative for appetite change and fever  HENT: Negative  Negative for hearing loss, tinnitus, trouble swallowing and voice change  Eyes: Negative  Negative for photophobia and pain  Respiratory: Negative  Negative for shortness of breath  Cardiovascular: Negative  Negative for palpitations  Gastrointestinal: Negative  Negative for nausea and vomiting  Endocrine: Negative  Negative for cold intolerance  Genitourinary: Negative  Negative for dysuria, frequency and urgency  Musculoskeletal: Positive for neck pain  Negative for myalgias  Skin: Negative  Negative for rash  Neurological: Positive for dizziness  Negative for tremors, seizures, syncope, facial asymmetry, speech difficulty, weakness, light-headedness, numbness and headaches  Hematological: Negative  Does not bruise/bleed easily  Psychiatric/Behavioral: Negative  Negative for confusion, hallucinations and sleep disturbance

## 2022-06-20 ENCOUNTER — TELEMEDICINE (OUTPATIENT)
Dept: NEUROLOGY | Facility: CLINIC | Age: 37
End: 2022-06-20
Payer: COMMERCIAL

## 2022-06-20 DIAGNOSIS — M51.24 THORACIC DISC HERNIATION: Primary | ICD-10-CM

## 2022-06-20 DIAGNOSIS — R20.2 PARESTHESIAS: ICD-10-CM

## 2022-06-20 PROCEDURE — 99214 OFFICE O/P EST MOD 30 MIN: CPT | Performed by: NURSE PRACTITIONER

## 2022-06-20 RX ORDER — GABAPENTIN 600 MG/1
600 TABLET ORAL 3 TIMES DAILY
Qty: 90 TABLET | Refills: 6 | Status: SHIPPED | OUTPATIENT
Start: 2022-06-20

## 2022-06-20 RX ORDER — CYCLOBENZAPRINE HCL 10 MG
10 TABLET ORAL
Qty: 30 TABLET | Refills: 3 | Status: SHIPPED | OUTPATIENT
Start: 2022-06-20

## 2022-06-20 NOTE — PROGRESS NOTES
Virtual Regular Visit    Verification of patient location: [place]    Patient is located in the following state in which I hold an active license [place]      Assessment/Plan:    Problem List Items Addressed This Visit        Musculoskeletal and Integument    Thoracic disc herniation - Primary    Relevant Medications    gabapentin (Neurontin) 600 MG tablet    cyclobenzaprine (FLEXERIL) 10 mg tablet       Other    Paresthesias        Increase gabapentin to 600 mg 3 times a day-FYI new script will be 600 mg tablets, take only 1 tablet 3 times a day  Schedule MRI of the thoracic spine for re-evaluation of prior T7-T8 disc herniation  Reschedule and maintain appointment for Pain Management evaluation  Follow-up with orthopedics during July scheduled appointment  Follow-up with outpatient Neurology office and 4 months or sooner if needed       Reason for visit is   Chief Complaint   Patient presents with    Virtual Regular Visit        Encounter provider [facility]    Provider located at [address] [place] [address]  [phone]      Recent Visits  No visits were found meeting these conditions  Showing recent visits within past 7 days and meeting all other requirements  Today's Visits  Date Type Provider Dept   06/20/22 Telemedicine CARMEN [facility]   Showing today's visits and meeting all other requirements  Future Appointments  No visits were found meeting these conditions  Showing future appointments within next 150 days and meeting all other requirements       The patient was identified by name and date of birth  Sarahjuanita Knutson was informed that this is a telemedicine visit and that the visit is being conducted through [address] [place] [address] and patient was informed that this is a secure, HIPAA-compliant platform  She agrees to proceed     My office door was closed  No one else was in the room    She acknowledged consent and understanding of privacy and security of the video platform  The patient has agreed to participate and understands they can discontinue the visit at any time  Patient is aware this is a billable service  Subjective  Robyn Sheehan is a 39 y o  female who is following with the outpatient neurology office regarding intermittent paresthesia in the lower and upper extremities for over a year  Patient was seen on consultation 07/20/2020 by Dr Alberto Brown  Patient reported 1st 3 digits of her left hand with numbness and tingling radiating to her forearm and into her left shoulder  Few minutes later she would get bilateral hip numbness that radiated down her legs and feet  Reported at times her right hand would go numb  While seated or in wrong sexual positions her legs would go numb as well  Patient was having difficulties holding her urine and had bowel urgency a couple times, denies saddle anesthesia  Patient reported mid to lower back pain consistent basis, x-ray of the thoracic spine showed scoliosis with convexity of the right side  Patient had MRIs with MS protocol and multiple labs which were normal   MRI of the brain was negative for demyelinating lesions and was normal   Thoracic spine MRI reviewed with small focal T 78 central disc herniation effacing ventral CSF space and slight mass effect upon the ventral aspect of the cord for which Neurosurgery was referred and had determined patient to be a nonsurgical patient  Cervical MRI was normal   Patient had any EMG of the upper extremities done which was also normal   Patient had been on Dilaudid, Percocet and Neurontin in the past, she was started on nortriptyline 25 mg at bedtime for nerve pain  Patient had physical therapy and was recommended pain management follow-up  She reported she felt physical therapy made her symptoms worse making her very stiff and sore  Reported little mobility after her therapies    Patient previously reported not going to pain management due to unreliable transportation  During last office appointment 03/16/2022 patient reported increase in her pain in the mid back and right hip  She continues to have reports of numbness to her shoulders, hands, legs and feet  Right greater than left in the upper extremities, equal in the bilateral lower extremities  There is more numbness rather than pain, creates an odd sensation  Patient reports pain when trying to cross her legs, right greater than left  Patient has denied any mechanism of injury, she had been a CNA for approximately 17 years and 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 walking on cement floors  Patient was recommended pain management, orthopedic evaluation and started on gabapentin 300 mg t i d     Patient reports that she continues to have ongoing mid back pain  She started the gabapentin did not feels though it made much difference  We discussed nortriptyline, patient was unaware what that medication was for and did not feels though it was effective enough to continue taking therefore she stopped it  Patient stated that she continues to get pain in her mid spine, notes that when she stretches her she will get charley horses along the paraspinal muscles  She states her daughter does try to help massage without however she is very tense and very difficult to release  Patient was recommended to have a repeat MRI of the thoracic spine, last study was done approximately 2 years ago and patient with continued and worsened symptoms  Patient has not yet scheduled this  She has not gone to Pain Management, noted to appointment scheduled however she was unable to attend  Patient was to have appointment today prior to Neurology however reports that her son was sick so she could not go to be in person  Patient reports having an appointment with Orthopedics, Dr Rachel Barraza in July        Patient reports having a combination of pain medications and muscle relaxants in the past, she could not remember which medication she was taking  Patient does have some intermittent bouts of incontinence with urine and bowel, however for sleep we did discuss starting Flexeril  Patient advised if incontinence should worsen, recommendations would be to discontinue the muscle relaxant  Patient was at initial dosing of gabapentin, discussed increasing the 600 mg t i d   Patient will start new dosage, scripts sent to her pharmacy  Patient encouraged to schedule an MRI of the thoracic spine to evaluate for any worsening of her disc herniation  Advised patient to contact Pain Management in obtain an appointment  Encouraged her strongly to attend the appointment as pain management will need to physically assess her and assist her with treatment  Patient does also report ongoing tingling and numbness to the right side of her face, she gets occasional headaches however cannot correlate the headaches with the numbness  She has occasional radiating pain with numbness to her hips, feet, arms and hands  Patient does report ongoing issues with bladder incontinence and some bowel urgency  With the bowel urgency if she does not go in time she will have incontinence  Patient does admit to having rectal sensation and tone  Encourage patient to make adverse to attend her appointments and obtain MRI study  Adjusting her medications is not a fixed for the problem and not likely to fully resolve her pains  Patient will follow-up in outpatient Neurology office in 4 months or sooner      Past Medical History:   Diagnosis Date    Arthritis     right knee    Bacterial vaginosis     Baker cyst, right     knee    Left arm numbness     2020- Left arm numb and radiated up left arm into shoulder-    Paresthesias     Hips down both legs    Pilonidal abscess     Scoliosis     Tendinitis     right knee       Past Surgical History:   Procedure Laterality Date     SECTION      x 2    CYST REMOVAL      OOPHORECTOMY         Current Outpatient Medications   Medication Sig Dispense Refill    Cholecalciferol (Vitamin D3) 125 MCG (5000 UT) TABS Take 5,000 Units by mouth daily      cyclobenzaprine (FLEXERIL) 10 mg tablet Take 1 tablet (10 mg total) by mouth daily at bedtime 30 tablet 3    gabapentin (Neurontin) 600 MG tablet Take 1 tablet (600 mg total) by mouth 3 (three) times a day 90 tablet 6    ibuprofen (MOTRIN) 200 mg tablet Take 800 mg by mouth as needed for severe pain       No current facility-administered medications for this visit  Allergies   Allergen Reactions    Anesthesia S-I-40 [Propofol] Nausea Only and Vomiting       Review of Systems   Constitutional: Negative  Negative for appetite change and fever  HENT: Negative  Negative for hearing loss, tinnitus, trouble swallowing and voice change  Eyes: Negative  Negative for photophobia and pain  Respiratory: Negative  Negative for shortness of breath  Cardiovascular: Negative  Negative for palpitations  Gastrointestinal: Negative  Negative for nausea and vomiting  Endocrine: Negative  Negative for cold intolerance  Genitourinary: Negative  Negative for dysuria, frequency and urgency  Musculoskeletal: Positive for back pain  Negative for myalgias and neck pain  Skin: Negative  Negative for rash  Neurological: Negative  Negative for dizziness, tremors, seizures, syncope, facial asymmetry, speech difficulty, weakness, light-headedness, numbness and headaches  Hematological: Negative  Does not bruise/bleed easily  Psychiatric/Behavioral: Negative  Negative for confusion, hallucinations and sleep disturbance  ROS reviewed and discussed with patient  Video Exam    There were no vitals filed for this visit  Physical Exam  Constitutional:       Appearance: Normal appearance  She is well-developed  HENT:      Head: Normocephalic     Musculoskeletal:      Cervical back: Normal range of motion  Neurological:      Mental Status: She is alert and oriented to person, place, and time  Cranial Nerves: Cranial nerves are intact  Sensory: Sensation is intact  Motor: Motor function is intact  Psychiatric:         Attention and Perception: Attention and perception normal          Mood and Affect: Mood and affect normal          Speech: Speech normal          Behavior: Behavior normal  Behavior is cooperative  Thought Content: Thought content normal          Cognition and Memory: Cognition and memory normal          Judgment: Judgment normal         Physical exam is limited due to poor lighting and patient's home on video visit  I spent 20 minutes directly with the patient during this visit d/w her current symptoms, prior treatment plans and actions  Recommendations for further treatment options and plans  VIRTUAL VISIT [address] verbally agrees to participate in [employer]  Pt is aware that [employer] could be limited without vital signs or the ability to perform a full hands-on physical exam  Lili Mchugh understands she or the provider may request at any time to terminate the video visit and request the patient to seek care or treatment in person

## 2022-06-20 NOTE — PATIENT INSTRUCTIONS
Increase gabapentin to 600 mg 3 times a day-FYI new script will be 600 mg tablets, take only 1 tablet 3 times a day  Schedule MRI of the thoracic spine for re-evaluation of prior T7-T8 disc herniation  Reschedule and maintain appointment for Pain Management evaluation  Follow-up with orthopedics during July scheduled appointment  Follow-up with outpatient Neurology office and 4 months or sooner if needed

## 2022-06-21 ENCOUNTER — TELEPHONE (OUTPATIENT)
Dept: NEUROLOGY | Facility: CLINIC | Age: 37
End: 2022-06-21

## 2022-06-21 NOTE — TELEPHONE ENCOUNTER
Follow up appt scheduled with nilesh in 4m virtual visit   Orders mailed for MRI to address on file after visit summary placed in mail task completed

## 2022-10-28 ENCOUNTER — TELEPHONE (OUTPATIENT)
Dept: NEUROLOGY | Facility: CLINIC | Age: 37
End: 2022-10-28

## 2022-11-03 NOTE — TELEPHONE ENCOUNTER
Contacted the patient to confirm the appointment on 11/04/22, patient  the phone and disconnected the call

## 2022-11-21 ENCOUNTER — APPOINTMENT (EMERGENCY)
Dept: RADIOLOGY | Facility: HOSPITAL | Age: 37
End: 2022-11-21

## 2022-11-21 ENCOUNTER — HOSPITAL ENCOUNTER (EMERGENCY)
Facility: HOSPITAL | Age: 37
Discharge: HOME | End: 2022-11-21
Attending: EMERGENCY MEDICINE

## 2022-11-21 VITALS
TEMPERATURE: 97.7 F | DIASTOLIC BLOOD PRESSURE: 67 MMHG | OXYGEN SATURATION: 99 % | BODY MASS INDEX: 27.61 KG/M2 | HEART RATE: 91 BPM | WEIGHT: 175 LBS | RESPIRATION RATE: 14 BRPM | SYSTOLIC BLOOD PRESSURE: 131 MMHG

## 2022-11-21 DIAGNOSIS — M54.50 ACUTE RIGHT-SIDED LOW BACK PAIN WITHOUT SCIATICA: Primary | ICD-10-CM

## 2022-11-21 LAB
BILIRUB UR QL STRIP: NEGATIVE
CLARITY UR: CLEAR
COLOR UR: YELLOW
EXT PREGNANCY TEST URINE: NEGATIVE
EXT. CONTROL: NORMAL
GLUCOSE UR STRIP-MCNC: NEGATIVE MG/DL
HGB UR QL STRIP.AUTO: NEGATIVE
KETONES UR STRIP-MCNC: NEGATIVE MG/DL
LEUKOCYTE ESTERASE UR QL STRIP: NEGATIVE
NITRITE UR QL STRIP: NEGATIVE
PH UR STRIP.AUTO: 6 [PH]
PROT UR STRIP-MCNC: NEGATIVE MG/DL
SP GR UR STRIP.AUTO: 1.01 (ref 1–1.03)
UROBILINOGEN UR QL STRIP.AUTO: 0.2 E.U./DL

## 2022-11-21 RX ORDER — LIDOCAINE 50 MG/G
1 PATCH TOPICAL DAILY
Qty: 14 PATCH | Refills: 0 | Status: SHIPPED | OUTPATIENT
Start: 2022-11-21 | End: 2022-12-05

## 2022-11-21 RX ORDER — KETOROLAC TROMETHAMINE 30 MG/ML
15 INJECTION, SOLUTION INTRAMUSCULAR; INTRAVENOUS ONCE
Status: COMPLETED | OUTPATIENT
Start: 2022-11-21 | End: 2022-11-21

## 2022-11-21 RX ORDER — ACETAMINOPHEN 325 MG/1
650 TABLET ORAL ONCE
Status: COMPLETED | OUTPATIENT
Start: 2022-11-21 | End: 2022-11-21

## 2022-11-21 RX ORDER — METHOCARBAMOL 500 MG/1
500 TABLET, FILM COATED ORAL 2 TIMES DAILY
Qty: 20 TABLET | Refills: 0 | Status: SHIPPED | OUTPATIENT
Start: 2022-11-21

## 2022-11-21 RX ORDER — PREDNISONE 50 MG/1
50 TABLET ORAL DAILY
Qty: 3 TABLET | Refills: 0 | Status: SHIPPED | OUTPATIENT
Start: 2022-11-21 | End: 2022-11-24

## 2022-11-21 RX ORDER — HYDROMORPHONE HCL/PF 1 MG/ML
1 SYRINGE (ML) INJECTION ONCE
Status: COMPLETED | OUTPATIENT
Start: 2022-11-21 | End: 2022-11-21

## 2022-11-21 RX ORDER — NAPROXEN 375 MG/1
375 TABLET ORAL 2 TIMES DAILY WITH MEALS
Qty: 20 TABLET | Refills: 0 | Status: SHIPPED | OUTPATIENT
Start: 2022-11-21

## 2022-11-21 RX ORDER — MORPHINE SULFATE 4 MG/ML
4 INJECTION, SOLUTION INTRAMUSCULAR; INTRAVENOUS ONCE
Status: DISCONTINUED | OUTPATIENT
Start: 2022-11-21 | End: 2022-11-21

## 2022-11-21 RX ORDER — METHOCARBAMOL 500 MG/1
500 TABLET, FILM COATED ORAL ONCE
Status: COMPLETED | OUTPATIENT
Start: 2022-11-21 | End: 2022-11-21

## 2022-11-21 RX ADMIN — METHOCARBAMOL 500 MG: 500 TABLET ORAL at 11:51

## 2022-11-21 RX ADMIN — ACETAMINOPHEN 650 MG: 325 TABLET, FILM COATED ORAL at 11:51

## 2022-11-21 RX ADMIN — KETOROLAC TROMETHAMINE 15 MG: 30 INJECTION, SOLUTION INTRAMUSCULAR at 11:51

## 2022-11-21 RX ADMIN — HYDROMORPHONE HYDROCHLORIDE 1 MG: 1 INJECTION, SOLUTION INTRAMUSCULAR; INTRAVENOUS; SUBCUTANEOUS at 13:27

## 2022-11-21 NOTE — ED PROVIDER NOTES
History  Chief Complaint   Patient presents with   • Back Pain     Since the 19th has had right sided lower back pain that radiates to the right groin, and right leg to the right foot  PT is a 45 yo F with PMH of arthritis and intermittent paraesthesias currently being evaluated by neurology, who presents for evaluation of 2 weeks of worsening back pain  She states the pain is 10/10, starts in the lumbar area and radiates to the RLQ of the abdomen, R groin, R knee, and travells to the  right foot, she describes the pain as stiff, stabbing, cramping and shooting  She has tried ibuprofen at home without improvement in her symptoms   She denies trauma, fever, chills, nausea, vomiting, recent illness, hx of cancer, bowel or bladder incontinence, urinary retention, constipation, or LE weakness, but does endorse lifting heavy objects (pt has an 5 yo autistic son whom she physically lifts)      History provided by:  Patient  Back Pain  Location:  Lumbar spine  Quality:  Shooting, stabbing, cramping and stiffness  Stiffness is present:  Unable to specify  Radiates to:  R posterior upper leg, R thigh and R foot  Pain severity:  Severe  Pain is:  Unable to specify  Onset quality:  Gradual  Duration:  2 weeks  Timing:  Constant  Progression:  Worsening  Chronicity:  New  Context: lifting heavy objects    Context: not emotional stress, not falling and not jumping from heights    Relieved by:  Nothing  Worsened by:  Ambulation, sitting, lying down and movement  Ineffective treatments:  Ibuprofen  Associated symptoms: leg pain and paresthesias    Associated symptoms: no abdominal pain, no abdominal swelling, no bladder incontinence, no bowel incontinence, no chest pain, no dysuria, no fever, no headaches, no numbness, no pelvic pain, no perianal numbness, no tingling, no weakness and no weight loss    Risk factors: no hx of cancer, no hx of osteoporosis, no lack of exercise, no menopause, not obese, not pregnant, no recent surgery, no steroid use and no vascular disease        Prior to Admission Medications   Prescriptions Last Dose Informant Patient Reported? Taking? Cholecalciferol (Vitamin D3) 125 MCG (5000 UT) TABS Not Taking  Yes No   Sig: Take 5,000 Units by mouth daily   Patient not taking: Reported on 2022   gabapentin (Neurontin) 600 MG tablet Not Taking  No No   Sig: Take 1 tablet (600 mg total) by mouth 3 (three) times a day   Patient not taking: Reported on 2022   ibuprofen (MOTRIN) 200 mg tablet Past Week  Yes Yes   Sig: Take 800 mg by mouth as needed for severe pain      Facility-Administered Medications: None       Past Medical History:   Diagnosis Date   • Arthritis     right knee   • Bacterial vaginosis    • Baker cyst, right     knee   • Left arm numbness     2020- Left arm numb and radiated up left arm into shoulder-   • Paresthesias     Hips down both legs   • Pilonidal abscess    • Scoliosis    • Tendinitis     right knee       Past Surgical History:   Procedure Laterality Date   •  SECTION      x 2   • CYST REMOVAL     • OOPHORECTOMY         Family History   Problem Relation Age of Onset   • Colon cancer Mother    • Lung cancer Mother    • Autism Son    • No Known Problems Daughter    • No Known Problems Daughter    • No Known Problems Daughter      I have reviewed and agree with the history as documented  E-Cigarette/Vaping   • E-Cigarette Use Never User      E-Cigarette/Vaping Substances   • Nicotine No    • THC No    • CBD No    • Flavoring No    • Other No    • Unknown No      Social History     Tobacco Use   • Smoking status: Every Day     Packs/day: 1 00     Types: Cigarettes   • Smokeless tobacco: Never   Vaping Use   • Vaping Use: Never used   Substance Use Topics   • Alcohol use: Not Currently     Comment: Occ   • Drug use: No       Review of Systems   Constitutional: Negative for chills, fever and weight loss  HENT: Negative for ear pain and sore throat      Eyes: Negative for pain and visual disturbance  Respiratory: Negative for cough and shortness of breath  Cardiovascular: Negative for chest pain and palpitations  Gastrointestinal: Negative for abdominal pain, bowel incontinence and vomiting  Endocrine: Negative  Genitourinary: Negative for bladder incontinence, dysuria, hematuria and pelvic pain  Musculoskeletal: Positive for back pain and gait problem  Negative for arthralgias  Skin: Negative for color change and rash  Allergic/Immunologic: Negative  Neurological: Positive for paresthesias  Negative for tingling, seizures, syncope, weakness, numbness and headaches  Hematological: Negative  Psychiatric/Behavioral: Negative  All other systems reviewed and are negative  Physical Exam  Physical Exam  Vitals and nursing note reviewed  Constitutional:       General: She is not in acute distress  Appearance: She is well-developed  HENT:      Head: Normocephalic and atraumatic  Nose: Nose normal       Mouth/Throat:      Mouth: Mucous membranes are moist    Eyes:      Conjunctiva/sclera: Conjunctivae normal    Cardiovascular:      Rate and Rhythm: Normal rate and regular rhythm  Heart sounds: No murmur heard  Pulmonary:      Effort: Pulmonary effort is normal  No respiratory distress  Breath sounds: Normal breath sounds  Abdominal:      Palpations: Abdomen is soft  Tenderness: There is no abdominal tenderness  There is no right CVA tenderness or left CVA tenderness  Musculoskeletal:         General: No swelling  Cervical back: Neck supple  Lumbar back: Spasms and tenderness present  No signs of trauma or bony tenderness  Decreased range of motion  Negative right straight leg raise test and negative left straight leg raise test       Right hip: Tenderness present  Left hip: Normal       Right upper leg: Tenderness present        Left upper leg: Normal       Right knee: Normal       Left knee: Normal  Right lower leg: Normal       Left lower leg: Normal    Skin:     General: Skin is warm and dry  Capillary Refill: Capillary refill takes less than 2 seconds  Neurological:      General: No focal deficit present  Mental Status: She is alert  Cranial Nerves: No cranial nerve deficit  Sensory: No sensory deficit  Motor: No weakness        Coordination: Coordination normal       Gait: Gait abnormal       Deep Tendon Reflexes: Reflexes normal    Psychiatric:         Mood and Affect: Mood normal          Vital Signs  ED Triage Vitals [11/21/22 1047]   Temperature Pulse Respirations Blood Pressure SpO2   97 7 °F (36 5 °C) 91 14 131/67 99 %      Temp src Heart Rate Source Patient Position - Orthostatic VS BP Location FiO2 (%)   -- -- -- -- --      Pain Score       8           Vitals:    11/21/22 1047   BP: 131/67   Pulse: 91         Visual Acuity      ED Medications  Medications   ketorolac (TORADOL) injection 15 mg (15 mg Intramuscular Given 11/21/22 1151)   acetaminophen (TYLENOL) tablet 650 mg (650 mg Oral Given 11/21/22 1151)   methocarbamol (ROBAXIN) tablet 500 mg (500 mg Oral Given 11/21/22 1151)   HYDROmorphone (DILAUDID) injection 1 mg (1 mg Intramuscular Given 11/21/22 1327)       Diagnostic Studies  Results Reviewed     Procedure Component Value Units Date/Time    UA w Reflex to Microscopic w Reflex to Culture [[id number]] Collected: 11/21/22 1147    Lab Status: Final result Specimen: Urine, Clean Catch Updated: 11/21/22 1204     Color, UA Yellow     Clarity, UA Clear     Specific Gravity, UA 1 010     pH, UA 6 0     Leukocytes, UA Negative     Nitrite, UA Negative     Protein, UA Negative mg/dl      Glucose, UA Negative mg/dl      Ketones, UA Negative mg/dl      Urobilinogen, UA 0 2 E U /dl      Bilirubin, UA Negative     Occult Blood, UA Negative    POCT pregnancy, urine [[id number]]  (Normal) Resulted: 11/21/22 1150    Lab Status: Final result Updated: 11/21/22 1151     EXT Preg Test, Ur Negative     Control Valid                 XR lumbar spine 2 or 3 views   Final Result by Mimi Pacheco MD (11/21 1222)      No significant lumbar spinal abnormalities, consistent with MRI         Workstation performed: [id number]         XR abdomen 1 view kub   Final Result by Karin Bailey MD (11/21 1228)      Normal appearance of the abdomen  Workstation performed: [id number]                    Procedures  Procedures         ED Course                                             MDM  Number of Diagnoses or Management Options  Acute right-sided low back pain without sciatica: new and requires workup  Diagnosis management comments: Pt with acute back pain worsening x 2 weeks  Xray without acute findings  No improvement with ketorolac or robaxin in ED  Some improvement with IM dialudid  Discussed treatment options with patient - agreeable to trial a burst of prednisone - she states she gets emotionally labile with administration  She will try to take at night to reduce side effects    Pt educated on red flag symptoms that would necessitate return to ED>         Amount and/or Complexity of Data Reviewed  Clinical lab tests: reviewed  Tests in the radiology section of CPT®: ordered and reviewed  Tests in the medicine section of CPT®: reviewed  Decide to obtain previous medical records or to obtain history from someone other than the patient: yes  Review and summarize past medical records: yes  Independent visualization of images, tracings, or specimens: yes    Risk of Complications, Morbidity, and/or Mortality  Presenting problems: moderate  Diagnostic procedures: moderate  Management options: moderate        Disposition  Final diagnoses:   Acute right-sided low back pain without sciatica     Time reflects when diagnosis was documented in both MDM as applicable and the Disposition within this note     Time User Action Codes Description Comment    11/21/2022  1:10 PM Vin Mccauley Add [I28 90] Acute right-sided low back pain without sciatica       ED Disposition     ED Disposition   Discharge    Condition   Stable    Date/Time   Mon Nov 21, 2022  1:25 PM    Comment   Sandra Hebert discharge to home/self care                 Follow-up Information     Follow up With Specialties Details Why Contact Info Additional [address], [place] Family Medicine Call  As needed [address]       [address] Emergency Department Emergency Medicine Go to  If symptoms worsen [address] [id number] [address] [address] Emergency Department, [place], [place], [address] [place] [address] Pain Medicine Schedule an appointment as soon as possible for a visit   [phone]  [id number], [facility], [place], [place], [address]          Discharge Medication List as of 11/21/2022  1:26 PM      START taking these medications    Details   lidocaine (Lidoderm) 5 % Apply 1 patch topically daily for 14 days Remove & Discard patch within 12 hours or as directed by MD, Starting Mon 11/21/2022, Until Mon 12/5/2022, Normal      methocarbamol (ROBAXIN) 500 mg tablet Take 1 tablet (500 mg total) by mouth 2 (two) times a day, Starting Mon 11/21/2022, Normal      naproxen (NAPROSYN) 375 mg tablet Take 1 tablet (375 mg total) by mouth 2 (two) times a day with meals, Starting Mon 11/21/2022, Normal      predniSONE 50 mg tablet Take 1 tablet (50 mg total) by mouth daily for 3 days, Starting Mon 11/21/2022, Until u 11/24/2022, Normal         CONTINUE these medications which have NOT CHANGED    Details   ibuprofen (MOTRIN) 200 mg tablet Take 800 mg by mouth as needed for severe pain, Historical Med         STOP taking these medications       Cholecalciferol (Vitamin D3) 125 MCG (5000 UT) TABS Comments:   Reason for Stopping:         gabapentin (Neurontin) 600 MG tablet Comments:   Reason for Stopping:                   PDMP Review     None          ED Provider  Electronically Signed by           Radha Nolasco PA-C  11/22/22 0802       Radha Nolasco PA-C  11/22/22 5149

## 2022-11-22 ENCOUNTER — TELEPHONE (OUTPATIENT)
Dept: PHYSICAL THERAPY | Facility: OTHER | Age: 37
End: 2022-11-22

## 2022-11-22 NOTE — TELEPHONE ENCOUNTER
Message left for patient regarding referral entered for  [facility]    Contact information, hours of operation and VM instructions left  Nurse requested patient to CB if needed and leave Full Name &  on VM       Referral deferred for f/u attempt per protocol

## 2022-11-22 NOTE — TELEPHONE ENCOUNTER
Returned patient's call she placed to csp on 11/22/22 @ 1:54pm  I explained csp to her, and that we start with an eval in PT, with the advanced spine therapist    Patient states she had PT prior and it made things worse  Patient was scheduled with PM, Having a no show in June and a CX in Sept  Patient has an autistic son, and she doesn't drive, making it hard to keep appt's       CSP closed

## 2023-01-10 ENCOUNTER — OFFICE VISIT (OUTPATIENT)
Dept: URGENT CARE | Facility: CLINIC | Age: 38
End: 2023-01-10

## 2023-01-10 VITALS
WEIGHT: 173.2 LBS | DIASTOLIC BLOOD PRESSURE: 60 MMHG | SYSTOLIC BLOOD PRESSURE: 120 MMHG | RESPIRATION RATE: 18 BRPM | OXYGEN SATURATION: 100 % | TEMPERATURE: 98.9 F | HEART RATE: 94 BPM | HEIGHT: 67 IN | BODY MASS INDEX: 27.18 KG/M2

## 2023-01-10 DIAGNOSIS — R39.9 UTI SYMPTOMS: Primary | ICD-10-CM

## 2023-01-10 LAB
SL AMB  POCT GLUCOSE, UA: NEGATIVE
SL AMB LEUKOCYTE ESTERASE,UA: ABNORMAL
SL AMB POCT BILIRUBIN,UA: NEGATIVE
SL AMB POCT BLOOD,UA: ABNORMAL
SL AMB POCT CLARITY,UA: CLEAR
SL AMB POCT COLOR,UA: YELLOW
SL AMB POCT KETONES,UA: NEGATIVE
SL AMB POCT NITRITE,UA: POSITIVE
SL AMB POCT PH,UA: 6
SL AMB POCT SPECIFIC GRAVITY,UA: 1.02
SL AMB POCT URINE PROTEIN: NEGATIVE
SL AMB POCT UROBILINOGEN: 0.2

## 2023-01-10 RX ORDER — CETIRIZINE HYDROCHLORIDE 10 MG/1
10 TABLET ORAL DAILY
COMMUNITY

## 2023-01-10 RX ORDER — NITROFURANTOIN 25; 75 MG/1; MG/1
100 CAPSULE ORAL 2 TIMES DAILY
Qty: 14 CAPSULE | Refills: 0 | Status: SHIPPED | OUTPATIENT
Start: 2023-01-10 | End: 2023-01-17

## 2023-01-11 NOTE — PATIENT INSTRUCTIONS
Take antibiotic as instructed for suspected UTI  We will send urine for culture and contact if needed  Also sent for gonorrhea/chlamydia testing, results to be in in the next few days  Discussed can access results via Naldo  All patient's questions answered and are agreeable with this plan

## 2023-01-11 NOTE — PROGRESS NOTES
[facility]        NAME: Weston Tirado is a 44 y o  female  : 1985    MRN: [id number]  DATE: January 10, 2023  TIME: 7:04 PM    Assessment and Plan   UTI symptoms [R39 9]  1  UTI symptoms  POCT urine dip    Urine culture    nitrofurantoin (MACROBID) 100 mg capsule    Chlamydia/GC amplified DNA by PCR            Patient Instructions     Patient Instructions   Take antibiotic as instructed for suspected UTI  We will send urine for culture and contact if needed  Also sent for gonorrhea/chlamydia testing, results to be in in the next few days  Discussed can access results via TMAT  All patient's questions answered and are agreeable with this plan  Follow up with PCP in 3-5 days  Proceed to  ER if symptoms worsen  Chief Complaint     Chief Complaint   Patient presents with   • Possible UTI     X 8 days - noted foul odor with urgency, frequency and feels like bladder is not emptying completely  Has low abd  And low back pain  No fever, N/V  History of Present Illness       Patient is a 11-year-old female presenting today with UTI symptoms x8 days  Patient notes over the last few days she has been experiencing some dysuria, increased frequency of urination and lower abdominal discomfort, has had UTIs in the past which have presented similarly, has not had one for many years  Notes that she has been drinking more fluids and cranberry juice but notes no relief of her symptoms  Patient also states she would like to be tested for gonorrhea and chlamydia, notes that she is sexually active with 1 male partner but is unsure of his status  Denies fever, abdominal pain, flank pain, vaginal discharge or drainage, hematuria  Review of Systems   Review of Systems   Constitutional: Negative for chills and fever  HENT: Negative for sore throat  Respiratory: Negative for cough  Cardiovascular: Negative for chest pain     Gastrointestinal: Negative for abdominal pain, diarrhea, nausea and vomiting  Genitourinary: Positive for dysuria and frequency  Negative for flank pain and hematuria  Musculoskeletal: Negative for arthralgias and myalgias  Skin: Negative for rash  Neurological: Negative for headaches           Current Medications       Current Outpatient Medications:   •  cetirizine (ZyrTEC) 10 mg tablet, Take 10 mg by mouth daily, Disp: , Rfl:   •  ibuprofen (MOTRIN) 200 mg tablet, Take 800 mg by mouth as needed for severe pain, Disp: , Rfl:   •  methocarbamol (ROBAXIN) 500 mg tablet, Take 1 tablet (500 mg total) by mouth 2 (two) times a day (Patient taking differently: Take 500 mg by mouth 2 (two) times a day as needed), Disp: 20 tablet, Rfl: 0  •  naproxen (NAPROSYN) 375 mg tablet, Take 1 tablet (375 mg total) by mouth 2 (two) times a day with meals (Patient taking differently: Take 375 mg by mouth 2 (two) times a day as needed), Disp: 20 tablet, Rfl: 0  •  nitrofurantoin (MACROBID) 100 mg capsule, Take 1 capsule (100 mg total) by mouth 2 (two) times a day for 7 days, Disp: 14 capsule, Rfl: 0  •  lidocaine (Lidoderm) 5 %, Apply 1 patch topically daily for 14 days Remove & Discard patch within 12 hours or as directed by MD, Disp: 14 patch, Rfl: 0    Current Allergies     Allergies as of 01/10/2023 - Reviewed 01/10/2023   Allergen Reaction Noted   • Morphine Vomiting 01/10/2023   • Anesthesia s-i-40 [propofol] Nausea Only and Vomiting 09/15/2020            The following portions of the patient's history were reviewed and updated as appropriate: allergies, current medications, past family history, past medical history, past social history, past surgical history and problem list      Past Medical History:   Diagnosis Date   • Allergic rhinitis    • Arthritis     right knee   • Bacterial vaginosis    • Baker cyst, right     knee   • Chronic mid back pain    • HPV (human papilloma virus) infection    • Left arm numbness     7/2/2020- Left arm numb and radiated up left arm into shoulder-   • Paresthesias     Hips down both legs   • Pilonidal abscess    • Scoliosis    • Tendinitis     right knee   • Urinary tract infection        Past Surgical History:   Procedure Laterality Date   •  SECTION      x 2   • CYST REMOVAL     • OOPHORECTOMY         Family History   Problem Relation Age of Onset   • Colon cancer Mother    • Lung cancer Mother    • Autism Son    • No Known Problems Daughter    • No Known Problems Daughter    • No Known Problems Daughter          Medications have been verified  Objective   /60   Pulse 94   Temp 98 9 °F (37 2 °C)   Resp 18   Ht 5' 6 75" (1 695 m)   Wt 78 6 kg (173 lb 3 2 oz)   LMP 2022 (Exact Date)   SpO2 100%   BMI 27 33 kg/m²        Physical Exam     Physical Exam  Vitals and nursing note reviewed  Constitutional:       General: She is not in acute distress  Appearance: Normal appearance  She is not ill-appearing  HENT:      Head: Normocephalic and atraumatic  Right Ear: External ear normal       Left Ear: External ear normal    Eyes:      Conjunctiva/sclera: Conjunctivae normal    Cardiovascular:      Rate and Rhythm: Normal rate and regular rhythm  Pulses: Normal pulses  Heart sounds: Normal heart sounds  Pulmonary:      Effort: Pulmonary effort is normal       Breath sounds: Normal breath sounds  Abdominal:      General: Abdomen is flat  Bowel sounds are normal       Palpations: Abdomen is soft  Tenderness: There is no abdominal tenderness  There is no right CVA tenderness or left CVA tenderness  Skin:     General: Skin is warm  Capillary Refill: Capillary refill takes less than 2 seconds  Neurological:      Mental Status: She is alert

## 2023-01-12 LAB
C TRACH RRNA SPEC QL NAA+PROBE: NEGATIVE
N GONORRHOEA RRNA SPEC QL NAA+PROBE: NEGATIVE

## 2023-01-14 LAB
BACTERIA UR CULT: ABNORMAL
Lab: ABNORMAL
SL AMB ANTIMICROBIAL SUSCEPTIBILITY: ABNORMAL

## 2023-07-09 ENCOUNTER — APPOINTMENT (EMERGENCY)
Dept: RADIOLOGY | Facility: HOSPITAL | Age: 38
End: 2023-07-09
Payer: COMMERCIAL

## 2023-07-09 ENCOUNTER — HOSPITAL ENCOUNTER (EMERGENCY)
Facility: HOSPITAL | Age: 38
Discharge: HOME | End: 2023-07-09
Attending: EMERGENCY MEDICINE
Payer: COMMERCIAL

## 2023-07-09 VITALS
WEIGHT: 173 LBS | HEART RATE: 84 BPM | OXYGEN SATURATION: 97 % | DIASTOLIC BLOOD PRESSURE: 72 MMHG | BODY MASS INDEX: 27.3 KG/M2 | SYSTOLIC BLOOD PRESSURE: 111 MMHG | RESPIRATION RATE: 18 BRPM

## 2023-07-09 DIAGNOSIS — M54.10 RADICULOPATHY AFFECTING UPPER EXTREMITY: ICD-10-CM

## 2023-07-09 DIAGNOSIS — R20.2 PARESTHESIAS: Primary | ICD-10-CM

## 2023-07-09 LAB
ALBUMIN SERPL BCP-MCNC: 4.2 G/DL (ref 3.5–5)
ALP SERPL-CCNC: 48 U/L (ref 34–104)
ALT SERPL W P-5'-P-CCNC: 10 U/L (ref 7–52)
ANION GAP SERPL CALCULATED.3IONS-SCNC: 9 MMOL/L
APTT PPP: 24 SECONDS (ref 23–37)
AST SERPL W P-5'-P-CCNC: 13 U/L (ref 13–39)
BACTERIA UR QL AUTO: ABNORMAL /HPF
BASOPHILS # BLD AUTO: 0.03 THOUSANDS/ÂΜL (ref 0–0.1)
BASOPHILS NFR BLD AUTO: 0 % (ref 0–1)
BILIRUB SERPL-MCNC: 0.28 MG/DL (ref 0.2–1)
BILIRUB UR QL STRIP: NEGATIVE
BUN SERPL-MCNC: 6 MG/DL (ref 5–25)
CALCIUM SERPL-MCNC: 9.2 MG/DL (ref 8.4–10.2)
CARDIAC TROPONIN I PNL SERPL HS: <2 NG/L
CHLORIDE SERPL-SCNC: 108 MMOL/L (ref 96–108)
CLARITY UR: ABNORMAL
CO2 SERPL-SCNC: 22 MMOL/L (ref 21–32)
COLOR UR: YELLOW
CREAT SERPL-MCNC: 0.8 MG/DL (ref 0.6–1.3)
EOSINOPHIL # BLD AUTO: 0.14 THOUSAND/ÂΜL (ref 0–0.61)
EOSINOPHIL NFR BLD AUTO: 1 % (ref 0–6)
ERYTHROCYTE [DISTWIDTH] IN BLOOD BY AUTOMATED COUNT: 15.9 % (ref 11.6–15.1)
EXT PREGNANCY TEST URINE: NEGATIVE
EXT. CONTROL: NORMAL
GFR SERPL CREATININE-BSD FRML MDRD: 93 ML/MIN/1.73SQ M
GLUCOSE SERPL-MCNC: 82 MG/DL (ref 65–140)
GLUCOSE SERPL-MCNC: 82 MG/DL (ref 65–140)
GLUCOSE UR STRIP-MCNC: NEGATIVE MG/DL
HCT VFR BLD AUTO: 39.4 % (ref 34.8–46.1)
HGB BLD-MCNC: 12.9 G/DL (ref 11.5–15.4)
HGB UR QL STRIP.AUTO: ABNORMAL
IMM GRANULOCYTES # BLD AUTO: 0.02 THOUSAND/UL (ref 0–0.2)
IMM GRANULOCYTES NFR BLD AUTO: 0 % (ref 0–2)
INR PPP: 0.9 (ref 0.84–1.19)
KETONES UR STRIP-MCNC: NEGATIVE MG/DL
LEUKOCYTE ESTERASE UR QL STRIP: NEGATIVE
LYMPHOCYTES # BLD AUTO: 2.52 THOUSANDS/ÂΜL (ref 0.6–4.47)
LYMPHOCYTES NFR BLD AUTO: 25 % (ref 14–44)
MCH RBC QN AUTO: 28.7 PG (ref 26.8–34.3)
MCHC RBC AUTO-ENTMCNC: 32.7 G/DL (ref 31.4–37.4)
MCV RBC AUTO: 88 FL (ref 82–98)
MONOCYTES # BLD AUTO: 0.64 THOUSAND/ÂΜL (ref 0.17–1.22)
MONOCYTES NFR BLD AUTO: 6 % (ref 4–12)
NEUTROPHILS # BLD AUTO: 6.79 THOUSANDS/ÂΜL (ref 1.85–7.62)
NEUTS SEG NFR BLD AUTO: 68 % (ref 43–75)
NITRITE UR QL STRIP: NEGATIVE
NON-SQ EPI CELLS URNS QL MICRO: ABNORMAL /HPF
NRBC BLD AUTO-RTO: 0 /100 WBCS
PH UR STRIP.AUTO: 5.5 [PH]
PLATELET # BLD AUTO: 331 THOUSANDS/UL (ref 149–390)
PMV BLD AUTO: 9.7 FL (ref 8.9–12.7)
POTASSIUM SERPL-SCNC: 3.4 MMOL/L (ref 3.5–5.3)
PROT SERPL-MCNC: 7.3 G/DL (ref 6.4–8.4)
PROT UR STRIP-MCNC: NEGATIVE MG/DL
PROTHROMBIN TIME: 12.3 SECONDS (ref 11.6–14.5)
RBC # BLD AUTO: 4.5 MILLION/UL (ref 3.81–5.12)
RBC #/AREA URNS AUTO: ABNORMAL /HPF
SODIUM SERPL-SCNC: 139 MMOL/L (ref 135–147)
SP GR UR STRIP.AUTO: 1.01 (ref 1–1.03)
UROBILINOGEN UR QL STRIP.AUTO: 0.2 E.U./DL
WBC # BLD AUTO: 10.14 THOUSAND/UL (ref 4.31–10.16)
WBC #/AREA URNS AUTO: ABNORMAL /HPF

## 2023-07-09 PROCEDURE — 84484 ASSAY OF TROPONIN QUANT: CPT | Performed by: EMERGENCY MEDICINE

## 2023-07-09 PROCEDURE — 70496 CT ANGIOGRAPHY HEAD: CPT

## 2023-07-09 PROCEDURE — 70498 CT ANGIOGRAPHY NECK: CPT

## 2023-07-09 PROCEDURE — 93005 ELECTROCARDIOGRAM TRACING: CPT

## 2023-07-09 PROCEDURE — 81001 URINALYSIS AUTO W/SCOPE: CPT | Performed by: EMERGENCY MEDICINE

## 2023-07-09 PROCEDURE — 85610 PROTHROMBIN TIME: CPT | Performed by: EMERGENCY MEDICINE

## 2023-07-09 PROCEDURE — 36415 COLL VENOUS BLD VENIPUNCTURE: CPT | Performed by: EMERGENCY MEDICINE

## 2023-07-09 PROCEDURE — 81025 URINE PREGNANCY TEST: CPT | Performed by: EMERGENCY MEDICINE

## 2023-07-09 PROCEDURE — 85730 THROMBOPLASTIN TIME PARTIAL: CPT | Performed by: EMERGENCY MEDICINE

## 2023-07-09 PROCEDURE — 80053 COMPREHEN METABOLIC PANEL: CPT | Performed by: EMERGENCY MEDICINE

## 2023-07-09 PROCEDURE — G1004 CDSM NDSC: HCPCS

## 2023-07-09 PROCEDURE — 85025 COMPLETE CBC W/AUTO DIFF WBC: CPT | Performed by: EMERGENCY MEDICINE

## 2023-07-09 PROCEDURE — 99285 EMERGENCY DEPT VISIT HI MDM: CPT

## 2023-07-09 PROCEDURE — 82948 REAGENT STRIP/BLOOD GLUCOSE: CPT

## 2023-07-09 RX ORDER — PREDNISONE 20 MG/1
40 TABLET ORAL ONCE
Status: COMPLETED | OUTPATIENT
Start: 2023-07-09 | End: 2023-07-09

## 2023-07-09 RX ORDER — PREDNISONE 10 MG/1
TABLET ORAL
Qty: 20 TABLET | Refills: 0 | Status: SHIPPED | OUTPATIENT
Start: 2023-07-09

## 2023-07-09 RX ORDER — ALPRAZOLAM 0.5 MG/1
0.5 TABLET ORAL ONCE
Status: COMPLETED | OUTPATIENT
Start: 2023-07-09 | End: 2023-07-09

## 2023-07-09 RX ADMIN — PREDNISONE 40 MG: 20 TABLET ORAL at 23:21

## 2023-07-09 RX ADMIN — IOHEXOL 100 ML: 350 INJECTION, SOLUTION INTRAVENOUS at 21:41

## 2023-07-09 RX ADMIN — ALPRAZOLAM 0.5 MG: 0.5 TABLET ORAL at 23:21

## 2023-07-10 NOTE — QUICK NOTE
Bran Simon is a 44 yo F wit PMH of paresthesia, thoracic disc herniation presents as stroke alert with left sided numbness and tingling. She is in a lot of stress and anxious. Patient has complained of similar sxs on and off since 2020 to me in past and has had negative MS work up. Her only revealing work up for T7-8 disc herniation. Denies associated headache. TNK Decision: Patient not a candidate. Symptoms resolved/clearly non disabling. Consider her clinical history and age, suspicion for stroke is very low. If sxs resolve, do not need to admit. If persist, then can admit for observation.   Give one time aspirin 325mg      Reason for Consult / Principal Problem: stroke like sxs  Hx and PE limited by: none  Patient last known well: 7pm  Stroke alert called: 9:26pm  Neurology time of arrival: discussed case with ED at 9:27pm

## 2023-07-10 NOTE — ED PROVIDER NOTES
History  Chief Complaint   Patient presents with   • Numbness     Reported that she has been having L facial and L arm numbness started 2 hrs ago. Co heart discomfort     42 yo female says she was very stressed and upset at home 2 hours ago and developed left side facial numbness. Then it started to radiate down left arm and left arm is painful. She is also having chest pressure. No trouble walking, talking. No sob, belly pain, fever, cough, headache, vomiting or diarrhea. Has h/o anxiety but says she doesn't usually get numbness like this with it. She is a smoker. No history of high BP, high chol., DM. She says she is very stressed about her living situation and has a lot going on. History provided by:  Patient   used: No        Prior to Admission Medications   Prescriptions Last Dose Informant Patient Reported? Taking?    cetirizine (ZyrTEC) 10 mg tablet   Yes No   Sig: Take 10 mg by mouth daily   ibuprofen (MOTRIN) 200 mg tablet   Yes No   Sig: Take 800 mg by mouth as needed for severe pain   lidocaine (Lidoderm) 5 %   No No   Sig: Apply 1 patch topically daily for 14 days Remove & Discard patch within 12 hours or as directed by MD   methocarbamol (ROBAXIN) 500 mg tablet   No No   Sig: Take 1 tablet (500 mg total) by mouth 2 (two) times a day   Patient taking differently: Take 500 mg by mouth 2 (two) times a day as needed   naproxen (NAPROSYN) 375 mg tablet   No No   Sig: Take 1 tablet (375 mg total) by mouth 2 (two) times a day with meals   Patient taking differently: Take 375 mg by mouth 2 (two) times a day as needed      Facility-Administered Medications: None       Past Medical History:   Diagnosis Date   • Allergic rhinitis    • Arthritis     right knee   • Bacterial vaginosis    • Baker cyst, right     knee   • Chronic mid back pain    • HPV (human papilloma virus) infection    • Left arm numbness     7/2/2020- Left arm numb and radiated up left arm into shoulder-   • Paresthesias     Hips down both legs   • Pilonidal abscess    • Scoliosis    • Tendinitis     right knee   • Urinary tract infection        Past Surgical History:   Procedure Laterality Date   •  SECTION      x 2   • CYST REMOVAL     • OOPHORECTOMY         Family History   Problem Relation Age of Onset   • Colon cancer Mother    • Lung cancer Mother    • Autism Son    • No Known Problems Daughter    • No Known Problems Daughter    • No Known Problems Daughter      I have reviewed and agree with the history as documented. E-Cigarette/Vaping   • E-Cigarette Use Never User      E-Cigarette/Vaping Substances   • Nicotine No    • THC No    • CBD No    • Flavoring No    • Other No    • Unknown No      Social History     Tobacco Use   • Smoking status: Every Day     Packs/day: 0.50     Types: Cigarettes   • Smokeless tobacco: Never   Vaping Use   • Vaping Use: Never used   Substance Use Topics   • Alcohol use: Yes     Comment: rare   • Drug use: No       Review of Systems   Constitutional: Negative. Negative for fever. HENT: Negative. Eyes: Negative. Respiratory: Negative. Negative for cough and shortness of breath. Cardiovascular: Positive for chest pain. Gastrointestinal: Negative. Negative for abdominal pain, diarrhea, nausea and vomiting. Genitourinary: Negative. Negative for dysuria and flank pain. Musculoskeletal: Positive for back pain. Negative for myalgias. Skin: Negative. Negative for rash. Neurological: Positive for numbness. Negative for dizziness and headaches. Hematological: Does not bruise/bleed easily. Psychiatric/Behavioral: Negative. All other systems reviewed and are negative. Physical Exam  Physical Exam  Vitals and nursing note reviewed. Constitutional:       General: She is not in acute distress. Appearance: She is well-developed. She is not ill-appearing or diaphoretic. HENT:      Head: Normocephalic and atraumatic.    Eyes:      Extraocular Movements: Extraocular movements intact. Conjunctiva/sclera: Conjunctivae normal.      Pupils: Pupils are equal, round, and reactive to light. Cardiovascular:      Rate and Rhythm: Regular rhythm. Tachycardia present. Heart sounds: Normal heart sounds. No murmur heard. Pulmonary:      Effort: Pulmonary effort is normal. No respiratory distress. Breath sounds: Normal breath sounds. Abdominal:      General: Bowel sounds are normal. There is no distension. Palpations: Abdomen is soft. Tenderness: There is no abdominal tenderness. Musculoskeletal:         General: No deformity. Normal range of motion. Cervical back: Normal range of motion and neck supple. Right lower leg: No edema. Left lower leg: No edema. Skin:     General: Skin is warm and dry. Coloration: Skin is not pale. Findings: No rash. Neurological:      Mental Status: She is alert and oriented to person, place, and time. Cranial Nerves: No cranial nerve deficit. Sensory: Sensory deficit present. Motor: No weakness.    Psychiatric:         Behavior: Behavior normal.      Comments: Appears a little anxious         Vital Signs  ED Triage Vitals   Temp Pulse Respirations Blood Pressure SpO2   -- 07/09/23 2120 07/09/23 2120 07/09/23 2120 07/09/23 2120    (!) 121 18 157/74 99 %      Temp src Heart Rate Source Patient Position - Orthostatic VS BP Location FiO2 (%)   -- 07/09/23 2151 07/09/23 2120 07/09/23 2120 --    Monitor Sitting Right arm       Pain Score       --                  Vitals:    07/09/23 2245 07/09/23 2246 07/09/23 2251 07/09/23 2256   BP:  115/72 108/70 107/72   Pulse: 90 92 88 84   Patient Position - Orthostatic VS:             Visual Acuity      ED Medications  Medications   predniSONE tablet 40 mg (has no administration in time range)   ALPRAZolam (XANAX) tablet 0.5 mg (has no administration in time range)   iohexol (OMNIPAQUE) 350 MG/ML injection (SINGLE-DOSE) 100 mL (100 mL Intravenous Given 7/9/23 2141)       Diagnostic Studies  Results Reviewed     Procedure Component Value Units Date/Time    Urine Microscopic [[id number]]  (Abnormal) Collected: 07/09/23 2144    Lab Status: Final result Specimen: Urine, Clean Catch Updated: 07/09/23 2209     RBC, UA Innumerable /hpf      WBC, UA 4-10 /hpf      Epithelial Cells Occasional /hpf      Bacteria, UA Moderate /hpf     UA (URINE) with reflex to Scope [[id number]]  (Abnormal) Collected: 07/09/23 2144    Lab Status: Final result Specimen: Urine, Clean Catch Updated: 07/09/23 2205     Color, UA Yellow     Clarity, UA Slightly Cloudy     Specific Gravity, UA 1.010     pH, UA 5.5     Leukocytes, UA Negative     Nitrite, UA Negative     Protein, UA Negative mg/dl      Glucose, UA Negative mg/dl      Ketones, UA Negative mg/dl      Urobilinogen, UA 0.2 E.U./dl      Bilirubin, UA Negative     Occult Blood, UA Large    HS Troponin 0hr (reflex protocol) [[id number]]  (Normal) Collected: 07/09/23 2124    Lab Status: Final result Specimen: Blood from Arm, Right Updated: 07/09/23 2155     hs TnI 0hr <2 ng/L     CBC and differential [[id number]]  (Abnormal) Collected: 07/09/23 2124    Lab Status: Final result Specimen: Blood from Arm, Right Updated: 07/09/23 2150     WBC 10.14 Thousand/uL      RBC 4.50 Million/uL      Hemoglobin 12.9 g/dL      Hematocrit 39.4 %      MCV 88 fL      MCH 28.7 pg      MCHC 32.7 g/dL      RDW 15.9 %      MPV 9.7 fL      Platelets 163 Thousands/uL      nRBC 0 /100 WBCs      Neutrophils Relative 68 %      Immat GRANS % 0 %      Lymphocytes Relative 25 %      Monocytes Relative 6 %      Eosinophils Relative 1 %      Basophils Relative 0 %      Neutrophils Absolute 6.79 Thousands/µL      Immature Grans Absolute 0.02 Thousand/uL      Lymphocytes Absolute 2.52 Thousands/µL      Monocytes Absolute 0.64 Thousand/µL      Eosinophils Absolute 0.14 Thousand/µL      Basophils Absolute 0.03 Thousands/µL     Comprehensive metabolic panel [[id number]]  (Abnormal) Collected: 07/09/23 2124    Lab Status: Final result Specimen: Blood from Arm, Right Updated: 07/09/23 2148     Sodium 139 mmol/L      Potassium 3.4 mmol/L      Chloride 108 mmol/L      CO2 22 mmol/L      ANION GAP 9 mmol/L      BUN 6 mg/dL      Creatinine 0.80 mg/dL      Glucose 82 mg/dL      Calcium 9.2 mg/dL      AST 13 U/L      ALT 10 U/L      Alkaline Phosphatase 48 U/L      Total Protein 7.3 g/dL      Albumin 4.2 g/dL      Total Bilirubin 0.28 mg/dL      eGFR 93 ml/min/1.73sq m     Narrative:      [place] guidelines for Chronic Kidney Disease (CKD):   •  Stage 1 with normal or high GFR (GFR > 90 mL/min/1.73 square meters)  •  Stage 2 Mild CKD (GFR = 60-89 mL/min/1.73 square meters)  •  Stage 3A Moderate CKD (GFR = 45-59 mL/min/1.73 square meters)  •  Stage 3B Moderate CKD (GFR = 30-44 mL/min/1.73 square meters)  •  Stage 4 Severe CKD (GFR = 15-29 mL/min/1.73 square meters)  •  Stage 5 End Stage CKD (GFR <15 mL/min/1.73 square meters)  Note: GFR calculation is accurate only with a steady state creatinine    POCT pregnancy, urine [[id number]]  (Normal) Resulted: 07/09/23 2145    Lab Status: Final result Updated: 07/09/23 2145     EXT Preg Test, Ur Negative     Control Valid    Protime-INR [[id number]]  (Normal) Collected: 07/09/23 2124    Lab Status: Final result Specimen: Blood from Arm, Right Updated: 07/09/23 2144     Protime 12.3 seconds      INR 0.90    APTT [[id number]]  (Normal) Collected: 07/09/23 2124    Lab Status: Final result Specimen: Blood from Arm, Right Updated: 07/09/23 2144     PTT 24 seconds     Fingerstick Glucose (POCT) [[id number]]  (Normal) Collected: 07/09/23 2125    Lab Status: Final result Updated: 07/09/23 2128     POC Glucose 82 mg/dl                  CTA stroke alert (head/neck)   Final Result by Adrian Maya DO (07/09 2202)      No evidence of large aneurysm, high-grade stenosis, or large vessel occlusion.    0% stenosis of the bilateral cervical internal carotid arteries by NASCET criteria. Patent vertebral arteries. Findings were directly discussed with Dr. Dandy Johnson at 9:54 p.m. on 7/9/2023. Workstation performed: [id number]         CT stroke alert brain   Final Result by Anthony Arroyo DO (07/09 2201)      No evidence of acute intracranial hemorrhage or mass effect. Findings were directly discussed with Dr. Dandy Johnson at 9:54 p.m. on 7/9/2023. Workstation performed: [id number]                    Procedures  ECG 12 Lead Documentation Only    Date/Time: 7/9/2023 9:20 PM    Performed by: Valeri Camejo MD  Authorized by: Valeri Camejo MD    Indications / Diagnosis:  Numbness  ECG reviewed by me, the ED Provider: yes    Patient location:  ED  Previous ECG:     Previous ECG:  Unavailable  Interpretation:     Interpretation: abnormal    Rate:     ECG rate:  125    ECG rate assessment: tachycardic    Rhythm:     Rhythm: sinus tachycardia    Ectopy:     Ectopy: none    QRS:     QRS axis:  Normal  Conduction:     Conduction: normal    ST segments:     ST segments:  Normal  T waves:     T waves: normal               ED Course                  Stroke Assessment     Row Name 07/09/23 2259             NIH Stroke Scale    Interval Baseline      Level of Consciousness (1a.) 0      LOC Questions (1b.) 0      LOC Commands (1c.) 0      Best Gaze (2.) 0      Visual (3.) 0      Facial Palsy (4.) 0      Motor Arm, Left (5a.) 0      Motor Arm, Right (5b.) 0      Motor Leg, Left (6a.) 0      Motor Leg, Right (6b.) 0      Limb Ataxia (7.) 0      Sensory (8.) 1      Best Language (9.) 0      Dysarthria (10.) 0      Extinction and Inattention (11.) (Formerly Neglect) 0      Total 1              Flowsheet Row Most Recent Value   Thrombolytic Decision Options    Thrombolytic Decision Patient not a candidate. Patient is not a candidate options Symptoms resolved/clearly non disabling. Medical Decision Making  Stroke alert called on arrival based on her symptoms and subjective decreased sensation to left side of face and arm. Rest of neurologic exam normal.  Discussed with neurology and prior records reviewed. Pt. Has long history of paresthesias intermittently, has seen neurology in the past, had MRIs. CTA and CT scan normal.  Pt. Re-evaluated after CT and symptoms are improving, now complaining more of left arm pain - could be more related to radiculopathy and pt. Is under a lot of stress it seems. Blood work ok, heart rate down, Pulse ox and BP good. Will discharge with prednisone taper and Xanax for tonight, advised rest, follow up outpt. Amount and/or Complexity of Data Reviewed  Labs: ordered. Radiology: ordered. Risk  Prescription drug management. Disposition  Final diagnoses:   Paresthesias   Radiculopathy affecting upper extremity     Time reflects when diagnosis was documented in both MDM as applicable and the Disposition within this note     Time User Action Codes Description Comment    9/3/1534 51:65 PM Winda [employer] A Add [X67.8] Paresthesias     5/6/6251 38:34 PM [facility] [employer] Add [Q71.29] Radiculopathy affecting upper extremity       ED Disposition     ED Disposition   Discharge    Condition   Stable    Date/Time   Sun Jul 9, 2023 11:08 PM    [address] discharge to home/self care.                Follow-up Information     Follow up With Specialties Details Why [address], [place] Family Medicine Schedule an appointment as soon as possible for a visit   [address] [place] [address]  [phone]            Patient's Medications   Discharge Prescriptions    PREDNISONE 10 MG TABLET    4 po daily x2 days, then 3 po daily x2 days, then 2 po daily x2 days,then 1 po daily x2days       Start Date: 7/9/2023  End Date: --       Order Dose: --       Quantity: 20 tablet    Refills: 0 No discharge procedures on file.     PDMP Review     None          ED Provider  Electronically Signed by           Nathalie Amaya MD  14/35/58 [id number]

## 2023-07-12 LAB
ATRIAL RATE: 125 BPM
P AXIS: 82 DEGREES
PR INTERVAL: 124 MS
QRS AXIS: 88 DEGREES
QRSD INTERVAL: 82 MS
QT INTERVAL: 312 MS
QTC INTERVAL: 450 MS
T WAVE AXIS: 51 DEGREES
VENTRICULAR RATE: 125 BPM

## 2023-07-12 PROCEDURE — 93010 ELECTROCARDIOGRAM REPORT: CPT | Performed by: INTERNAL MEDICINE
